# Patient Record
Sex: FEMALE | NOT HISPANIC OR LATINO | Employment: UNEMPLOYED | URBAN - METROPOLITAN AREA
[De-identification: names, ages, dates, MRNs, and addresses within clinical notes are randomized per-mention and may not be internally consistent; named-entity substitution may affect disease eponyms.]

---

## 2017-01-04 ENCOUNTER — TELEPHONE (OUTPATIENT)
Dept: MATERNAL FETAL MEDICINE | Facility: CLINIC | Age: 22
End: 2017-01-04

## 2017-01-04 NOTE — TELEPHONE ENCOUNTER
Received ultrasound reports from [facility].  The patient had an ultrasound on 9/6/16 that confirmed her due by LMP of 2/18/17 (fetal measurements averaged 16 weeks and 4 days).  Therefore, the LMP dating is correct, and LMP-based ZELALEM of 2/18/17 should be used.   Thus, the fetus was AGA at the time of the ultrasound study performed here in the [facility] unit on 12/14/16.

## 2017-01-24 ENCOUNTER — PATIENT MESSAGE (OUTPATIENT)
Dept: OBSTETRICS AND GYNECOLOGY | Facility: CLINIC | Age: 22
End: 2017-01-24

## 2017-01-26 ENCOUNTER — PATIENT MESSAGE (OUTPATIENT)
Dept: OBSTETRICS AND GYNECOLOGY | Facility: CLINIC | Age: 22
End: 2017-01-26

## 2017-02-01 ENCOUNTER — LAB VISIT (OUTPATIENT)
Dept: LAB | Facility: OTHER | Age: 22
End: 2017-02-01
Attending: OBSTETRICS & GYNECOLOGY
Payer: MEDICAID

## 2017-02-01 ENCOUNTER — ROUTINE PRENATAL (OUTPATIENT)
Dept: OBSTETRICS AND GYNECOLOGY | Facility: CLINIC | Age: 22
End: 2017-02-01
Payer: MEDICAID

## 2017-02-01 VITALS
DIASTOLIC BLOOD PRESSURE: 80 MMHG | SYSTOLIC BLOOD PRESSURE: 120 MMHG | BODY MASS INDEX: 29.68 KG/M2 | WEIGHT: 167.56 LBS

## 2017-02-01 DIAGNOSIS — Z34.03 ENCOUNTER FOR SUPERVISION OF NORMAL FIRST PREGNANCY IN THIRD TRIMESTER: Primary | ICD-10-CM

## 2017-02-01 DIAGNOSIS — Z34.03 ENCOUNTER FOR SUPERVISION OF NORMAL FIRST PREGNANCY IN THIRD TRIMESTER: ICD-10-CM

## 2017-02-01 DIAGNOSIS — D58.2 HEMOGLOBIN C TRAIT: ICD-10-CM

## 2017-02-01 LAB
BASOPHILS # BLD AUTO: 0.03 K/UL
BASOPHILS NFR BLD: 0.7 %
DIFFERENTIAL METHOD: ABNORMAL
EOSINOPHIL # BLD AUTO: 0 K/UL
EOSINOPHIL NFR BLD: 0 %
ERYTHROCYTE [DISTWIDTH] IN BLOOD BY AUTOMATED COUNT: 19.7 %
HCT VFR BLD AUTO: 28.3 %
HGB BLD-MCNC: 9.3 G/DL
LYMPHOCYTES # BLD AUTO: 1.5 K/UL
LYMPHOCYTES NFR BLD: 36.1 %
MCH RBC QN AUTO: 22.5 PG
MCHC RBC AUTO-ENTMCNC: 32.9 %
MCV RBC AUTO: 69 FL
MONOCYTES # BLD AUTO: 0.9 K/UL
MONOCYTES NFR BLD: 21.8 %
NEUTROPHILS # BLD AUTO: 1.7 K/UL
NEUTROPHILS NFR BLD: 40.9 %
PLATELET # BLD AUTO: 296 K/UL
PMV BLD AUTO: 9.5 FL
RBC # BLD AUTO: 4.13 M/UL
WBC # BLD AUTO: 4.18 K/UL

## 2017-02-01 PROCEDURE — 36415 COLL VENOUS BLD VENIPUNCTURE: CPT

## 2017-02-01 PROCEDURE — 99213 OFFICE O/P EST LOW 20 MIN: CPT | Mod: TH,S$PBB,, | Performed by: OBSTETRICS & GYNECOLOGY

## 2017-02-01 PROCEDURE — 85025 COMPLETE CBC W/AUTO DIFF WBC: CPT

## 2017-02-01 PROCEDURE — 86592 SYPHILIS TEST NON-TREP QUAL: CPT

## 2017-02-01 PROCEDURE — 99999 PR PBB SHADOW E&M-EST. PATIENT-LVL II: CPT | Mod: PBBFAC,,, | Performed by: OBSTETRICS & GYNECOLOGY

## 2017-02-01 PROCEDURE — 86703 HIV-1/HIV-2 1 RESULT ANTBDY: CPT

## 2017-02-01 NOTE — MR AVS SNAPSHOT
[facility] [address]  [address]  [address]  [facility] [address]  Phone: [phone]  Fax: [phone]                  Nati Billy   2017 3:00 PM   Routine Prenatal    Description:  Female : 1995   Provider:  Suze Odonnell MD   Department:  [facility] [address]           Reason for Visit     Routine Prenatal Visit                To Do List           Future Appointments        Provider Department Dept Phone    2017 3:30 PM Suze Odonnell MD [facility] [address] [phone]    2017 3:30 PM Suze Odonnell MD [facility] [address] [phone]      Goals (5 Years of Data)     None      [facility]     [facility] Line - 24/7 Assistance  Registered nurses in the [facility] Center provide clinical advisement, health education, appointment booking, and other advisory services.  Call for this free service at [phone].             Medications           Message regarding Medications     Verify the changes and/or additions to your medication regime listed below are the same as discussed with your clinician today.  If any of these changes or additions are incorrect, please notify your healthcare provider.             Verify that the below list of medications is an accurate representation of the medications you are currently taking.  If none reported, the list may be blank. If incorrect, please contact your healthcare provider. Carry this list with you in case of emergency.           Current Medications     PRENATAL VIT #91/FE FUM/FA/DHA (PRENATAL + DHA ORAL) Take by mouth.           Clinical Reference Information           Prenatal Vitals     Enc. Date GA Prenatal Vitals Prenatal Pulse Pain Level Urine Albumin/Glucose Edema Presentation Dilation/Effacement/Station    17 37w4d 120/80 / 76 kg (167 lb 8.8 oz)  /  / Present  0 Negative / Negative None / None      16 33w3d 110/60 / 75 kg (165 lb  5.5 oz) 29 cm / +++ / Present  0 Negative / Negative None / None         Number of fetuses: 1       Vital Signs - Last Recorded  Most recent update: 2/1/2017  3:27 PM by Jennifer Cardoza    BP Wt LMP BMI       120/80 76 kg (167 lb 8.8 oz) 05/14/2016 29.68 kg/m2       Allergies as of 2/1/2017     No Known Allergies      Immunizations Administered on Date of Encounter - 2/1/2017     None

## 2017-02-01 NOTE — PROGRESS NOTES
Positive fetal movement.  No contractions, vaginal bleeding, or loss of fluid.    Discussed hemoglobin C trait with patient.  Dad's blood type is uknown.  Discussed the chances that the baby would have sickle cell disease.   TDAP never done.  Will do today.   CBC, RPR, HIv, and GBS today.

## 2017-02-02 ENCOUNTER — PATIENT MESSAGE (OUTPATIENT)
Dept: OBSTETRICS AND GYNECOLOGY | Facility: CLINIC | Age: 22
End: 2017-02-02

## 2017-02-02 DIAGNOSIS — D50.8 IRON DEFICIENCY ANEMIA SECONDARY TO INADEQUATE DIETARY IRON INTAKE: Primary | ICD-10-CM

## 2017-02-02 LAB
HIV 1+2 AB+HIV1 P24 AG SERPL QL IA: NEGATIVE
RPR SER QL: NORMAL

## 2017-02-02 RX ORDER — FERROUS SULFATE 325(65) MG
325 TABLET ORAL DAILY
Qty: 60 TABLET | Refills: 3 | Status: ON HOLD | OUTPATIENT
Start: 2017-02-02 | End: 2017-02-17 | Stop reason: HOSPADM

## 2017-02-03 ENCOUNTER — HOSPITAL ENCOUNTER (EMERGENCY)
Facility: OTHER | Age: 22
Discharge: HOME | End: 2017-02-03
Payer: MEDICAID

## 2017-02-03 VITALS
SYSTOLIC BLOOD PRESSURE: 126 MMHG | RESPIRATION RATE: 18 BRPM | OXYGEN SATURATION: 100 % | TEMPERATURE: 98 F | HEART RATE: 91 BPM | DIASTOLIC BLOOD PRESSURE: 75 MMHG

## 2017-02-03 LAB — BACTERIA SPEC AEROBE CULT: NORMAL

## 2017-02-03 PROCEDURE — 76815 OB US LIMITED FETUS(S): CPT

## 2017-02-03 PROCEDURE — 59025 FETAL NON-STRESS TEST: CPT | Mod: 59

## 2017-02-03 PROCEDURE — 99284 EMERGENCY DEPT VISIT MOD MDM: CPT | Mod: 25

## 2017-02-03 NOTE — DISCHARGE INSTRUCTIONS
False Labor  If your pregnancy is at 37 weeks or longer and you are having contractions that are not true labor, you are having false labor. This means that it is not time yet to give birth to your baby.  True labor contractions can start unevenly but soon take on a regular pattern. As time goes on, the contractions will get stronger. Also, the intervals between the contractions will get shorter. Even at the onset, these contractions last at least 30 seconds and may increase to a minute. True labor contractions often start in the back and then move to the front.  False labor contractions can be strong, frequent, and painful, but there is no regular pattern. The intensity can vary from strong to mild to strong again. False labor contractions are most often felt in the front. While true labor contractions dont stop no matter what you are doing, false labor contractions may stop on their own or when you rest or move around.  False labor contractions might make you feel anxious or lose sleep. However, they dont mean that you are sick or that anything is wrong with your baby. You dont need to take any medicine for false labor.  Sometimes, it may be too hard to tell false labor from true labor. In such cases, you may need to have a vaginal exam. This allows your healthcare provider to check for changes in the cervix that only occur with true labor.  Home care  · It may help to drink plenty of water and take warm baths. Do what you can ahead of time to prepare for giving birth so youll have less to worry about later.  · Keep a record of your contractions. Write down what time each one starts and how long it lasts. A stopwatch is helpful. Look for the pattern of regularly spaced out contractions with a gradual increase in the time each one lasts.  · Dont be embarrassed about going to the hospital with a false alarm. Think of it as good practice for the real thing.    Follow-up care  Follow up with your healthcare  provider, or as advised. If you are very worried, confused, cant eat or sleep, or have questions about your health or pregnancy, schedule an appointment with your provider.  When to seek medical advice  Call your healthcare provider right away if any of these occur:  · You are fewer than 37 weeks along in your pregnancy and youre having contractions.  · You have contractions that are regular, getting longer, stronger, and closer together.  · Your water breaks.  · You have vaginal bleeding.  · You feel a decrease in your babys movement or any other unusual changes.   · Youre not sure if you are having false or true labor.  Date Last Reviewed: 8/20/2015  © 3664-9548 [employer]. [address] [place] [address], [place] [address]. All rights reserved. This information is not intended as a substitute for professional medical care. Always follow your healthcare professional's instructions.

## 2017-02-03 NOTE — ED AVS SNAPSHOT
[facility]  [address]  [facility] [address]               Nati Billy   2/3/2017 12:47 PM   ED    Description:  Female : 1995   Department:  [facility]           Your Care was Coordinated By:     Provider Role From To    Blanka De Leon MD Resident 17 1250 --      Reason for Visit     Contractions           Diagnoses this Visit        Comments    Prolonged latent phase of labor    -  Primary       ED Disposition     ED Disposition Condition Comment    Discharge             To Do List           Follow-up Information     Follow up with Suze Odonnell MD.    Specialty:  Obstetrics and Gynecology    Why:  prenatal appointment as scheduled    Contact information:    [address]  [facility] [address]  [phone]        [facility]     [facility] Care Line -  Assistance  Registered nurses in the [facility] provide clinical advisement, health education, appointment booking, and other advisory services.  Call for this free service at [phone].             Medications           Message regarding Medications     Verify the changes and/or additions to your medication regime listed below are the same as discussed with your clinician today.  If any of these changes or additions are incorrect, please notify your healthcare provider.             Verify that the below list of medications is an accurate representation of the medications you are currently taking.  If none reported, the list may be blank. If incorrect, please contact your healthcare provider. Carry this list with you in case of emergency.           Current Medications     ferrous sulfate 325 mg (65 mg iron) Tab tablet Take 1 tablet (325 mg total) by mouth once daily.    PRENATAL VIT #91/FE FUM/FA/DHA (PRENATAL + DHA ORAL) Take by mouth.           Clinical Reference Information           Your Vitals Were     BP Pulse Temp  Resp Last Period SpO2    126/75 91 97.7 °F (36.5 °C) (Temporal) 18 05/14/2016 100%      Allergies as of 2/3/2017     No Known Allergies      Immunizations Administered on Date of Encounter - 2/3/2017     None      ED Micro, Lab, POCT     None      ED Imaging Orders     None        Discharge Instructions         False Labor  If your pregnancy is at 37 weeks or longer and you are having contractions that are not true labor, you are having false labor. This means that it is not time yet to give birth to your baby.  True labor contractions can start unevenly but soon take on a regular pattern. As time goes on, the contractions will get stronger. Also, the intervals between the contractions will get shorter. Even at the onset, these contractions last at least 30 seconds and may increase to a minute. True labor contractions often start in the back and then move to the front.  False labor contractions can be strong, frequent, and painful, but there is no regular pattern. The intensity can vary from strong to mild to strong again. False labor contractions are most often felt in the front. While true labor contractions dont stop no matter what you are doing, false labor contractions may stop on their own or when you rest or move around.  False labor contractions might make you feel anxious or lose sleep. However, they dont mean that you are sick or that anything is wrong with your baby. You dont need to take any medicine for false labor.  Sometimes, it may be too hard to tell false labor from true labor. In such cases, you may need to have a vaginal exam. This allows your healthcare provider to check for changes in the cervix that only occur with true labor.  Home care  · It may help to drink plenty of water and take warm baths. Do what you can ahead of time to prepare for giving birth so youll have less to worry about later.  · Keep a record of your contractions. Write down what time each one starts and how long it  lasts. A stopwatch is helpful. Look for the pattern of regularly spaced out contractions with a gradual increase in the time each one lasts.  · Dont be embarrassed about going to the hospital with a false alarm. Think of it as good practice for the real thing.    Follow-up care  Follow up with your healthcare provider, or as advised. If you are very worried, confused, cant eat or sleep, or have questions about your health or pregnancy, schedule an appointment with your provider.  When to seek medical advice  Call your healthcare provider right away if any of these occur:  · You are fewer than 37 weeks along in your pregnancy and youre having contractions.  · You have contractions that are regular, getting longer, stronger, and closer together.  · Your water breaks.  · You have vaginal bleeding.  · You feel a decrease in your babys movement or any other unusual changes.   · Youre not sure if you are having false or true labor.  Date Last Reviewed: 8/20/2015  © 3253-8194 [employer]. [address] [place] [address]. All rights reserved. This information is not intended as a substitute for professional medical care. Always follow your healthcare professional's instructions.          Your Scheduled Appointments     Feb 09, 2017  3:00 PM CST   Injection with GYN, INJECTION   [facility] [address] ([facility])    [address] [place] [id number]   [phone]            Feb 09, 2017  3:30 PM CST   Routine Prenatal Visit with Suze Odonnell MD   [facility] [address] ([facility])    [address] [place] [address]   [phone]            Feb 16, 2017  3:30 PM CST   Routine Prenatal Visit with MD [facility] [address] ([facility])    [address] [address]   [phone]               [facility] complies with applicable Federal civil rights laws and does not discriminate on the  basis of race, color, national origin, age, disability, or sex.        Language Assistance Services     ATTENTION: Language assistance services are available, free of charge. Please call [phone].      ATENCIÓN: Si habla malgorzata, tiene a corrales disposición servicios gratuitos de asistencia lingüística. Llame al [phone].     CHÚ Ý: N?u b?n nói Ti?ng Vi?t, có các d?ch v? h? tr? ngôn ng? mi?n phí dành cho b?n. G?i s? [phone].

## 2017-02-15 ENCOUNTER — TELEPHONE (OUTPATIENT)
Dept: OBSTETRICS AND GYNECOLOGY | Facility: CLINIC | Age: 22
End: 2017-02-15

## 2017-02-15 ENCOUNTER — PATIENT MESSAGE (OUTPATIENT)
Dept: OBSTETRICS AND GYNECOLOGY | Facility: CLINIC | Age: 22
End: 2017-02-15

## 2017-02-16 ENCOUNTER — ANESTHESIA EVENT (OUTPATIENT)
Dept: OBSTETRICS AND GYNECOLOGY | Facility: OTHER | Age: 22
End: 2017-02-16
Payer: MEDICAID

## 2017-02-16 ENCOUNTER — HOSPITAL ENCOUNTER (INPATIENT)
Facility: OTHER | Age: 22
LOS: 2 days | Discharge: HOME | End: 2017-02-18
Attending: OBSTETRICS & GYNECOLOGY | Admitting: OBSTETRICS & GYNECOLOGY
Payer: MEDICAID

## 2017-02-16 ENCOUNTER — ANESTHESIA (OUTPATIENT)
Dept: OBSTETRICS AND GYNECOLOGY | Facility: OTHER | Age: 22
End: 2017-02-16
Payer: MEDICAID

## 2017-02-16 DIAGNOSIS — Z37.9 NORMAL LABOR: ICD-10-CM

## 2017-02-16 LAB
ABO + RH BLD: NORMAL
ALLENS TEST: ABNORMAL
AMPHET+METHAMPHET UR QL: NEGATIVE
ANISOCYTOSIS BLD QL SMEAR: SLIGHT
BARBITURATES UR QL SCN>200 NG/ML: NEGATIVE
BASOPHILS NFR BLD: 0 %
BENZODIAZ UR QL SCN>200 NG/ML: NEGATIVE
BLD GP AB SCN CELLS X3 SERPL QL: NORMAL
BZE UR QL SCN: NEGATIVE
CANNABINOIDS UR QL SCN: NORMAL
CREAT UR-MCNC: 78 MG/DL
DIFFERENTIAL METHOD: ABNORMAL
EOSINOPHIL NFR BLD: 2 %
ERYTHROCYTE [DISTWIDTH] IN BLOOD BY AUTOMATED COUNT: 21.8 %
ETHANOL UR-MCNC: <10 MG/DL
GIANT PLATELETS BLD QL SMEAR: PRESENT
HCO3 UR-SCNC: 22.7 MMOL/L (ref 24–28)
HCT VFR BLD AUTO: 28.7 %
HGB BLD-MCNC: 9.5 G/DL
LYMPHOCYTES NFR BLD: 19 %
MCH RBC QN AUTO: 22.7 PG
MCHC RBC AUTO-ENTMCNC: 33.1 %
MCV RBC AUTO: 69 FL
METHADONE UR QL SCN>300 NG/ML: NEGATIVE
MONOCYTES NFR BLD: 10 %
NEUTROPHILS NFR BLD: 69 %
OPIATES UR QL SCN: NEGATIVE
OVALOCYTES BLD QL SMEAR: ABNORMAL
PCO2 BLDA: 60.4 MMHG (ref 35–45)
PCP UR QL SCN>25 NG/ML: NEGATIVE
PH SMN: 7.18 [PH] (ref 7.35–7.45)
PLATELET # BLD AUTO: 374 K/UL
PLATELET BLD QL SMEAR: ABNORMAL
PMV BLD AUTO: 8.9 FL
PO2 BLDA: 9 MMHG (ref 80–100)
POC BE: -6 MMOL/L
POC SATURATED O2: 6 % (ref 95–100)
POIKILOCYTOSIS BLD QL SMEAR: SLIGHT
POLYCHROMASIA BLD QL SMEAR: ABNORMAL
RBC # BLD AUTO: 4.19 M/UL
SAMPLE: ABNORMAL
SITE: ABNORMAL
SMUDGE CELLS BLD QL SMEAR: PRESENT
TOXICOLOGY INFORMATION: NORMAL
WBC # BLD AUTO: 12.86 K/UL
WBC NRBC COR # BLD: 12.86 K/UL

## 2017-02-16 PROCEDURE — 25000003 PHARM REV CODE 250: Performed by: ANESTHESIOLOGY

## 2017-02-16 PROCEDURE — 25000003 PHARM REV CODE 250: Performed by: STUDENT IN AN ORGANIZED HEALTH CARE EDUCATION/TRAINING PROGRAM

## 2017-02-16 PROCEDURE — 86901 BLOOD TYPING SEROLOGIC RH(D): CPT

## 2017-02-16 PROCEDURE — 82803 BLOOD GASES ANY COMBINATION: CPT

## 2017-02-16 PROCEDURE — 27800517 HC TRAY,EPIDURAL-CONTINUOUS: Performed by: ANESTHESIOLOGY

## 2017-02-16 PROCEDURE — 72200004 HC VAGINAL DELIVERY LEVEL I

## 2017-02-16 PROCEDURE — 63600175 PHARM REV CODE 636 W HCPCS

## 2017-02-16 PROCEDURE — 27000181 HC CABLE, IUPC

## 2017-02-16 PROCEDURE — 27200710 HC EPIDURAL INFUSION PUMP SET: Performed by: ANESTHESIOLOGY

## 2017-02-16 PROCEDURE — 59409 OBSTETRICAL CARE: CPT | Mod: GB,,, | Performed by: OBSTETRICS & GYNECOLOGY

## 2017-02-16 PROCEDURE — 11000001 HC ACUTE MED/SURG PRIVATE ROOM

## 2017-02-16 PROCEDURE — 59409 OBSTETRICAL CARE: CPT | Mod: AA,,, | Performed by: ANESTHESIOLOGY

## 2017-02-16 PROCEDURE — 99900035 HC TECH TIME PER 15 MIN (STAT)

## 2017-02-16 PROCEDURE — 99285 EMERGENCY DEPT VISIT HI MDM: CPT | Mod: 25

## 2017-02-16 PROCEDURE — 99283 EMERGENCY DEPT VISIT LOW MDM: CPT | Mod: 25,,, | Performed by: OBSTETRICS & GYNECOLOGY

## 2017-02-16 PROCEDURE — 10907ZC DRAINAGE OF AMNIOTIC FLUID, THERAPEUTIC FROM PRODUCTS OF CONCEPTION, VIA NATURAL OR ARTIFICIAL OPENING: ICD-10-PCS | Performed by: OBSTETRICS & GYNECOLOGY

## 2017-02-16 PROCEDURE — 85025 COMPLETE CBC W/AUTO DIFF WBC: CPT

## 2017-02-16 PROCEDURE — 51702 INSERT TEMP BLADDER CATH: CPT

## 2017-02-16 PROCEDURE — 62326 NJX INTERLAMINAR LMBR/SAC: CPT | Performed by: ANESTHESIOLOGY

## 2017-02-16 PROCEDURE — 76815 OB US LIMITED FETUS(S): CPT

## 2017-02-16 PROCEDURE — 63600175 PHARM REV CODE 636 W HCPCS: Performed by: ANESTHESIOLOGY

## 2017-02-16 PROCEDURE — 59025 FETAL NON-STRESS TEST: CPT | Mod: 59

## 2017-02-16 PROCEDURE — 86900 BLOOD TYPING SEROLOGIC ABO: CPT

## 2017-02-16 PROCEDURE — 72100002 HC LABOR CARE, 1ST 8 HOURS

## 2017-02-16 PROCEDURE — 0HQ9XZZ REPAIR PERINEUM SKIN, EXTERNAL APPROACH: ICD-10-PCS | Performed by: OBSTETRICS & GYNECOLOGY

## 2017-02-16 PROCEDURE — 99499 UNLISTED E&M SERVICE: CPT | Mod: ,,, | Performed by: OBSTETRICS & GYNECOLOGY

## 2017-02-16 PROCEDURE — 80307 DRUG TEST PRSMV CHEM ANLYZR: CPT

## 2017-02-16 PROCEDURE — 59025 FETAL NON-STRESS TEST: CPT | Mod: 26,,, | Performed by: OBSTETRICS & GYNECOLOGY

## 2017-02-16 RX ORDER — OXYTOCIN/RINGER'S LACTATE 20/1000 ML
41.65 PLASTIC BAG, INJECTION (ML) INTRAVENOUS CONTINUOUS
Status: DISCONTINUED | OUTPATIENT
Start: 2017-02-16 | End: 2017-02-16

## 2017-02-16 RX ORDER — FAMOTIDINE 10 MG/ML
20 INJECTION INTRAVENOUS ONCE
Status: DISCONTINUED | OUTPATIENT
Start: 2017-02-16 | End: 2017-02-17

## 2017-02-16 RX ORDER — FENTANYL/BUPIVACAINE/NS/PF 2MCG/ML-.1
PLASTIC BAG, INJECTION (ML) INJECTION CONTINUOUS
Status: DISCONTINUED | OUTPATIENT
Start: 2017-02-16 | End: 2017-02-17

## 2017-02-16 RX ORDER — OXYCODONE AND ACETAMINOPHEN 5; 325 MG/1; MG/1
1 TABLET ORAL EVERY 4 HOURS PRN
Status: DISCONTINUED | OUTPATIENT
Start: 2017-02-16 | End: 2017-02-18 | Stop reason: HOSPADM

## 2017-02-16 RX ORDER — AMMONIA 15 % (W/V)
0.3 AMPUL (EA) INHALATION CONTINUOUS PRN
Status: DISCONTINUED | OUTPATIENT
Start: 2017-02-16 | End: 2017-02-18 | Stop reason: HOSPADM

## 2017-02-16 RX ORDER — DOCUSATE SODIUM 100 MG/1
100 CAPSULE, LIQUID FILLED ORAL 2 TIMES DAILY PRN
Status: DISCONTINUED | OUTPATIENT
Start: 2017-02-16 | End: 2017-02-18 | Stop reason: HOSPADM

## 2017-02-16 RX ORDER — SIMETHICONE 80 MG
1 TABLET,CHEWABLE ORAL EVERY 6 HOURS PRN
Status: DISCONTINUED | OUTPATIENT
Start: 2017-02-16 | End: 2017-02-18 | Stop reason: HOSPADM

## 2017-02-16 RX ORDER — METHYLERGONOVINE MALEATE 0.2 MG/ML
200 INJECTION INTRAVENOUS
Status: DISCONTINUED | OUTPATIENT
Start: 2017-02-16 | End: 2017-02-16

## 2017-02-16 RX ORDER — OXYCODONE AND ACETAMINOPHEN 10; 325 MG/1; MG/1
1 TABLET ORAL EVERY 4 HOURS PRN
Status: DISCONTINUED | OUTPATIENT
Start: 2017-02-16 | End: 2017-02-18 | Stop reason: HOSPADM

## 2017-02-16 RX ORDER — OXYTOCIN/RINGER'S LACTATE 20/1000 ML
20 PLASTIC BAG, INJECTION (ML) INTRAVENOUS ONCE
Status: DISCONTINUED | OUTPATIENT
Start: 2017-02-16 | End: 2017-02-16

## 2017-02-16 RX ORDER — LIDOCAINE HYDROCHLORIDE AND EPINEPHRINE 15; 5 MG/ML; UG/ML
INJECTION, SOLUTION EPIDURAL
Status: DISCONTINUED | OUTPATIENT
Start: 2017-02-16 | End: 2017-02-16

## 2017-02-16 RX ORDER — DIPHENHYDRAMINE HCL 25 MG
25 CAPSULE ORAL EVERY 4 HOURS PRN
Status: DISCONTINUED | OUTPATIENT
Start: 2017-02-16 | End: 2017-02-18 | Stop reason: HOSPADM

## 2017-02-16 RX ORDER — BUPIVACAINE HYDROCHLORIDE 2.5 MG/ML
INJECTION, SOLUTION EPIDURAL; INFILTRATION; INTRACAUDAL
Status: DISPENSED
Start: 2017-02-16 | End: 2017-02-16

## 2017-02-16 RX ORDER — HYDROCORTISONE 25 MG/G
CREAM TOPICAL 3 TIMES DAILY PRN
Status: DISCONTINUED | OUTPATIENT
Start: 2017-02-16 | End: 2017-02-18 | Stop reason: HOSPADM

## 2017-02-16 RX ORDER — METOCLOPRAMIDE HYDROCHLORIDE 5 MG/ML
10 INJECTION INTRAMUSCULAR; INTRAVENOUS ONCE
Status: DISCONTINUED | OUTPATIENT
Start: 2017-02-16 | End: 2017-02-17

## 2017-02-16 RX ORDER — OXYTOCIN/RINGER'S LACTATE 20/1000 ML
2 PLASTIC BAG, INJECTION (ML) INTRAVENOUS CONTINUOUS
Status: DISCONTINUED | OUTPATIENT
Start: 2017-02-16 | End: 2017-02-17

## 2017-02-16 RX ORDER — ONDANSETRON 4 MG/1
8 TABLET, ORALLY DISINTEGRATING ORAL EVERY 8 HOURS PRN
Status: DISCONTINUED | OUTPATIENT
Start: 2017-02-16 | End: 2017-02-18 | Stop reason: HOSPADM

## 2017-02-16 RX ORDER — FENTANYL CITRATE 50 UG/ML
INJECTION, SOLUTION INTRAMUSCULAR; INTRAVENOUS
Status: COMPLETED
Start: 2017-02-16 | End: 2017-02-16

## 2017-02-16 RX ORDER — FENTANYL/BUPIVACAINE/NS/PF 2MCG/ML-.1
PLASTIC BAG, INJECTION (ML) INJECTION CONTINUOUS PRN
Status: DISCONTINUED | OUTPATIENT
Start: 2017-02-16 | End: 2017-02-16

## 2017-02-16 RX ORDER — AMOXICILLIN 250 MG
1 CAPSULE ORAL NIGHTLY
Status: DISCONTINUED | OUTPATIENT
Start: 2017-02-16 | End: 2017-02-18 | Stop reason: HOSPADM

## 2017-02-16 RX ORDER — ONDANSETRON 8 MG/1
8 TABLET, ORALLY DISINTEGRATING ORAL EVERY 8 HOURS PRN
Status: DISCONTINUED | OUTPATIENT
Start: 2017-02-16 | End: 2017-02-16

## 2017-02-16 RX ORDER — FENTANYL/BUPIVACAINE/NS/PF 2MCG/ML-.1
PLASTIC BAG, INJECTION (ML) INJECTION
Status: DISPENSED
Start: 2017-02-16 | End: 2017-02-16

## 2017-02-16 RX ORDER — PHENYLEPHRINE HYDROCHLORIDE 10 MG/ML
INJECTION INTRAVENOUS
Status: DISCONTINUED | OUTPATIENT
Start: 2017-02-16 | End: 2017-02-16

## 2017-02-16 RX ORDER — SODIUM CITRATE AND CITRIC ACID MONOHYDRATE 334; 500 MG/5ML; MG/5ML
30 SOLUTION ORAL ONCE
Status: DISCONTINUED | OUTPATIENT
Start: 2017-02-16 | End: 2017-02-17

## 2017-02-16 RX ORDER — CARBOPROST TROMETHAMINE 250 UG/ML
250 INJECTION, SOLUTION INTRAMUSCULAR
Status: DISCONTINUED | OUTPATIENT
Start: 2017-02-16 | End: 2017-02-16

## 2017-02-16 RX ORDER — MISOPROSTOL 200 UG/1
600 TABLET ORAL
Status: DISCONTINUED | OUTPATIENT
Start: 2017-02-16 | End: 2017-02-16

## 2017-02-16 RX ORDER — SODIUM CHLORIDE, SODIUM LACTATE, POTASSIUM CHLORIDE, CALCIUM CHLORIDE 600; 310; 30; 20 MG/100ML; MG/100ML; MG/100ML; MG/100ML
INJECTION, SOLUTION INTRAVENOUS CONTINUOUS
Status: DISCONTINUED | OUTPATIENT
Start: 2017-02-16 | End: 2017-02-16

## 2017-02-16 RX ORDER — NAPROXEN 500 MG/1
500 TABLET ORAL EVERY 8 HOURS PRN
Status: DISCONTINUED | OUTPATIENT
Start: 2017-02-16 | End: 2017-02-18 | Stop reason: HOSPADM

## 2017-02-16 RX ADMIN — Medication 5 ML: at 08:02

## 2017-02-16 RX ADMIN — SODIUM CHLORIDE, SODIUM LACTATE, POTASSIUM CHLORIDE, AND CALCIUM CHLORIDE: .6; .31; .03; .02 INJECTION, SOLUTION INTRAVENOUS at 07:02

## 2017-02-16 RX ADMIN — Medication 10 ML/HR: at 08:02

## 2017-02-16 RX ADMIN — PHENYLEPHRINE HYDROCHLORIDE 100 MCG: 10 INJECTION INTRAVENOUS at 09:02

## 2017-02-16 RX ADMIN — NAPROXEN 500 MG: 500 TABLET ORAL at 09:02

## 2017-02-16 RX ADMIN — STANDARDIZED SENNA CONCENTRATE AND DOCUSATE SODIUM 1 TABLET: 8.6; 5 TABLET, FILM COATED ORAL at 08:02

## 2017-02-16 RX ADMIN — OXYCODONE AND ACETAMINOPHEN 1 TABLET: 5; 325 TABLET ORAL at 08:02

## 2017-02-16 RX ADMIN — Medication 333 MILLI-UNITS/MIN: at 02:02

## 2017-02-16 RX ADMIN — LIDOCAINE HYDROCHLORIDE AND EPINEPHRINE 3 ML: 15; 5 INJECTION, SOLUTION EPIDURAL at 08:02

## 2017-02-16 RX ADMIN — SODIUM CHLORIDE, SODIUM LACTATE, POTASSIUM CHLORIDE, AND CALCIUM CHLORIDE 1000 ML: .6; .31; .03; .02 INJECTION, SOLUTION INTRAVENOUS at 09:02

## 2017-02-16 RX ADMIN — FENTANYL CITRATE 100 MCG: 50 INJECTION, SOLUTION INTRAMUSCULAR; INTRAVENOUS at 08:02

## 2017-02-16 RX ADMIN — SODIUM CHLORIDE, SODIUM LACTATE, POTASSIUM CHLORIDE, AND CALCIUM CHLORIDE 1000 ML: .6; .31; .03; .02 INJECTION, SOLUTION INTRAVENOUS at 07:02

## 2017-02-16 NOTE — PROGRESS NOTES
LABOR PROGRESS NOTE    S:  MD to bedside for fetal decel to 55 bpm; recovery within 2 minutes with maternal repositioning; O2 mask on. Complaints: No.  Epidural in place and working.     O: Temp:  [95.9 °F (35.5 °C)-97.5 °F (36.4 °C)] 97.3 °F (36.3 °C)  Pulse:  [] 86  Resp:  [19] 19  SpO2:  [66 %-100 %] 95 %  BP: (105-174)/(50-83) 120/59    FHT: 145 BPM/moderate beat to beat variability/+accels/+ decels to 55bpm with rapid recovery; Cat 2 (reassuring)  CTX: q2-4 min, no pitocin at this time  SVE: /-2    ASSESSMENT:   21 y.o.  at 39w5d, in labor at term    FHT reassuring    Active Hospital Problems    Diagnosis  POA    *Normal labor [O80, Z37.9]  Not Applicable      Resolved Hospital Problems    Diagnosis Date Resolved POA   No resolved problems to display.     PLAN:    Labor management  Continue Close Maternal/Fetal Monitoring.   Pitocin Augmentation per protocol being held  AROM, clear  IUPC in place and working  Recheck 2 hours or PRN  Staff notified    lBanka De Leon MD, PhD  OBGYN, PGY-1

## 2017-02-16 NOTE — H&P
History and Physical                                            Obstetrics          Subjective:      Nati Billy is a 21 y.o. F at 39w5d presents complaining of contractions. Patient reports regular frequent contractions since 0300 this morning.   This IUP is complicated by Hgb C trait, family hx of SC disease and microcytic anemia. Patient denies vaginal bleeding, denies LOF. Fetal Movement: normal.    PMHx: History reviewed. No pertinent past medical history.    PSHx:   Past Surgical History   Procedure Laterality Date    Wrist surgery         All: Review of patient's allergies indicates:  No Known Allergies    Meds:   Prescriptions Prior to Admission   Medication Sig Dispense Refill Last Dose    ferrous sulfate 325 mg (65 mg iron) Tab tablet Take 1 tablet (325 mg total) by mouth once daily. 60 tablet 3     PRENATAL VIT #91/FE FUM/FA/DHA (PRENATAL + DHA ORAL) Take by mouth.   Taking       SH:   Social History     Social History    Marital status: Single     Spouse name: N/A    Number of children: N/A    Years of education: N/A     Occupational History    Not on file.     Social History Main Topics    Smoking status: Never Smoker    Smokeless tobacco: Not on file    Alcohol use No    Drug use: No    Sexual activity: Yes     Birth control/ protection: None     Other Topics Concern    Not on file     Social History Narrative       FH: History reviewed. No pertinent family history.    OBHx:   Obstetric History       T0      TAB0   SAB0   E0   M0   L0       # Outcome Date GA Lbr Duane/2nd Weight Sex Delivery Anes PTL Lv   1 Current                     Review of Systems:  Constitutional: Negative for fever.   HENT: Negative for sore throat.   Respiratory: Negative for shortness of breath.   Cardiovascular: Negative for chest pain.   Gastrointestinal: Negative for nausea.   Genitourinary: Negative for dysuria.   Musculoskeletal: Negative for back pain.   Skin: Negative for  rash.   Neurological: Negative for weakness.   Hematological: Does not bruise/bleed easily.     Objective:         Visit Vitals    /62    Pulse 99    Temp 97.3 °F (36.3 °C)    Resp 19    LMP 2016    SpO2 98%    Breastfeeding No       Temp:  [95.9 °F (35.5 °C)-97.5 °F (36.4 °C)] 97.3 °F (36.3 °C)  Pulse:  [] 99  Resp:  [19] 19  SpO2:  [95 %-100 %] 98 %  BP: (131-134)/(62-74) 134/62     Constitutional: She appears well-developed and well-nourished. She is not diaphoretic. No distress.   HENT:   Head: Normocephalic and atraumatic.   Eyes: Conjunctivae and EOM are normal.   Neck: Normal range of motion.   Cardiovascular: Normal rate.   Pulmonary/Chest: No respiratory distress.   Musculoskeletal: Normal range of motion.   Neurological: She is alert and oriented to person, place, and time.   Skin: Skin is warm and dry.   Psychiatric: She has a normal mood and affect.     SVE 1.5/90/-3 --> recheck 2 hrs 3/-3, bulging bag     Comments: FHT: reassuring, 145bpm, mod BTB variability, + accels, -decels  TOCO: q3-5min    Ultrasound:  cephalic      Lab Review  Blood Type O POS  GBBS: negative  Rubella: Immune  RPR: -  HIV: negative  HepB: negative           Assessment:       39w5d weeks gestation admitted for Normal labor    Active Hospital Problems    Diagnosis  POA    *Normal labor [O80, Z37.9]  Not Applicable      Resolved Hospital Problems    Diagnosis Date Resolved POA   No resolved problems to display.          Plan:          Normal labor  - Admit to Labor and Delivery unit  - Consents for delivery including vaginal or  section and blood transfusion signed and to chart  - Risks, benefits, alternatives and possible complications have been discussed in detail with the patient.   - Epidural per Anesthesia  - Draw CBC, T&S  - Notify Staff  - Recheck in 2 hrs or PRN          Post-Partum Hemorrhage risk - low    Sheron Cleary MD

## 2017-02-16 NOTE — PROGRESS NOTES
LABOR PROGRESS NOTE    S:  MD to bedside for cervical check; O2 mask on. Complaints: No.  Epidural in place and working.     O: Temp:  [95.9 °F (35.5 °C)-99 °F (37.2 °C)] 99 °F (37.2 °C)  Pulse:  [] 88  Resp:  [18-19] 18  SpO2:  [66 %-100 %] 100 %  BP: ()/(50-85) 137/63    FHT: 145 BPM/moderate beat to beat variability/+accels/+ occasional small variable decels; Cat 2 (reassuring)  CTX: q2-4 min, no pitocin at this time  SVE: anterior lip/100/+1-+2    ASSESSMENT:   21 y.o.  at 39w5d, in labor at term    FHT reassuring    Active Hospital Problems    Diagnosis  POA    *Normal labor [O80, Z37.9]  Not Applicable      Resolved Hospital Problems    Diagnosis Date Resolved POA   No resolved problems to display.     PLAN:    Labor management  Continue Close Maternal/Fetal Monitoring.   Pitocin Augmentation per protocol being held  AROM, clear  IUPC in place   Staff notified  Set up to push  Anticipate     Blanka De Leon MD, PhD  OBGYN, PGY-1

## 2017-02-16 NOTE — L&D DELIVERY NOTE
cephalic after approximately 12 minutes of maternal pushing.  Under epidural anesthesia.  Infant delivered OA over intact perineum.  Delivered through tight nuchal x1 noted at introitus.  Cord clamped and cut.  Female infant also tolerated the delivery well and was placed on mothers abdomen for skin to skin and bulb suctioning performed.  Umbilical arterial gas and venous blood obtained.  Placenta delivered spontaneously and IV pitocin given.  Uterine tone noted. No cervical lacerations.  Periurethral abrasion ws hemostatic and required no repair.   No posterior vaginal laceration required repair.   One left vaginal sidewall first degree laceration was repaired with 3-0 vicryl until hemostatic.  One superficial right vaginal sidewall abrasion was repaired with 3-0 vicryl in a figure-of-eight stitch until hemostatic.  Patient tolerated delivery well.   cc  Staff present for entire procedure.  S/L/N counts correct x2.        Delivery Information for  Manfred Billy    Birth information:  YOB: 2016   Time of birth: 2:35 PM   Sex: female   Head Delivery Date/Time: 2017  2:34 PM   Delivery type: Vaginal, Spontaneous Delivery   Gestational Age: 39w5d    Delivery Providers    Delivering clinician:  HONEY GORE   Other personnel:   Provider Role   GERALD WATT Registered Nurse   BRYSON LUIS Registered Nurse   VALENTE MACHADO Resident   CHERYL MOHR Registered Nurse   ZAID PERDUE Registered Nurse   MYRIAM MARIO [facility]                  [place] Measurements    Weight:  3260 g            Assessment    Living status:  Yes   Apgars    1 Minute:   5 Minute:   10 Minute 15 Minute 20 Minute   Skin Color: 0  1       Heart Rate: 2  2       Reflex Irritability: 2  2       Muscle Tone: 2  2       Respiratory Effort: 2  2       Total: 8  9                  Apgars Assigned By:  VICKY MOHR RN          Assisted Delivery Details:    Forceps attempted?:   No   Vacuum extractor attempted?:  No             Shoulder Dystocia    Shoulder dystocia present?:  No                                             Presentation and Position    Presentation: Vertex   Position:     Occiput    Anterior               Interventions/Resuscitation    Method:  Bulb Suctioning, Tactile Stimulation        Cord    Vessels:  3 vessels   Complications:  Nuchal   Nuchal Intervention:  reduced   Nuchal Cord Description:  loose nuchal cord   Number of Loops:  1   Delayed Cord Clamping?:  No   Cord Clamped Date/Time:  2017  2:36 PM   Cord Blood Disposition:  Sent with Baby   Gases Sent?:  Yes   Stem Cell Collection (by MD):  No        Placenta    Date and time:  2017  2:38 PM   Removal:  Spontaneous   Appearance:  Intact   Placenta disposition:  Discarded            Labor Events:       labor: No     Labor Onset Date/Time:         Dilation Complete Date/Time:         Start Pushing Date/Time:       Rupture Date/Time:              Rupture type:           Fluid Amount:        Fluid Color:        Fluid Odor:        Membrane Status (PeriCalm): ARM (Artificial Rupture)      Rupture Date/Time (PeriCalm): 2017 09:00:00      Fluid Amount (PeriCalm): Moderate      Fluid Color (PeriCalm): Clear       steroids: None     Antibiotics given for GBS: No     Induction: none     Indications for induction:        Augmentation: amniotomy     Indications for augmentation:       Labor complications: None     Additional complications:          Cervical ripening:                     Delivery:      Episiotomy: None     Indication for Episiotomy:       Perineal Lacerations: 1st Repaired:  Yes   Periurethral Laceration: none Repaired:     Labial Laceration: none Repaired:     Sulcus Laceration: none Repaired:     Vaginal Laceration: Yes Repaired: Yes   Cervical Laceration: No Repaired:     Repair suture:       Repair # of packets: 1     Blood loss (ml): 198     Vaginal Sweep Performed: Yes      Surgicount Correct: Yes       Other providers:       Anesthesia    Method:  Epidural              Details (if applicable):  Trial of Labor      Categorization:      Priority:     Indications for :     Incision Type:       Additional  information:  Forceps:    Vacuum:    Breech:    Observed anomalies    Other (Comments):         Blanka De Leon MD, PhD  OBGYN, PGY-1

## 2017-02-16 NOTE — NURSING
Pt given L&D admit instructions. Pt verbalized understanding. Reviewed plan of care with pt. All questions answered with no further questions at this time. Pt transported to LDR room.

## 2017-02-16 NOTE — ANESTHESIA PREPROCEDURE EVALUATION
2017  Nati Billy is a 21 y.o., female.     at 39w5d admitted for regular frequent contractions. Patient has anemia and sickle cell trait. Patient has had normal pregnancy without complications.    Nati Billy is a 21 y.o. female     OB History    Para Term  AB SAB TAB Ectopic Multiple Living   1               # Outcome Date GA Lbr Duane/2nd Weight Sex Delivery Anes PTL Lv   1 Current                   Wt Readings from Last 1 Encounters:   17 0750 75.8 kg (167 lb)       BP Readings from Last 3 Encounters:   17 134/62   17 126/75   17 120/80       Patient Active Problem List   Diagnosis    Family history of sickle cell disease- in grandmother, brother is carrier    Encounter for supervision of normal first pregnancy in third trimester    Hemoglobin C trait    Normal labor       Past Surgical History   Procedure Laterality Date    Wrist surgery         Social History     Social History    Marital status: Single     Spouse name: N/A    Number of children: N/A    Years of education: N/A     Occupational History    Not on file.     Social History Main Topics    Smoking status: Never Smoker    Smokeless tobacco: Not on file    Alcohol use No    Drug use: No    Sexual activity: Yes     Birth control/ protection: None     Other Topics Concern    Not on file     Social History Narrative         Chemistry    No results found for: NA, K, CL, CO2, BUN, CREATININE, GLU No results found for: CALCIUM, ALKPHOS, AST, ALT, BILITOT         Lab Results   Component Value Date    WBC 12.86 (H) 2017    HGB 9.5 (L) 2017    HCT 28.7 (L) 2017    MCV 69 (L) 2017     (H) 2017       No results for input(s): INR, PROTIME, APTT in the last 72 hours.    Invalid input(s): PT              [facility] Anesthesia  Evaluation         Review of Systems  Anesthesia Hx:  Denies Family Hx of Anesthesia complications.   Denies Personal Hx of Anesthesia complications.   Hematology/Oncology:     Oncology Normal    -- Anemia: Hematology Comments: Sickle cell trait    Cardiovascular:  Cardiovascular Normal     Pulmonary:  Pulmonary Normal    Hepatic/GI:  Hepatic/GI Normal    Musculoskeletal:  Musculoskeletal Normal    Neurological:  Neurology Normal    Endocrine:  Endocrine Normal        Physical Exam  General:  Well nourished    Airway/Jaw/Neck:  Airway Findings: Mouth Opening: Normal Tongue: Normal       Chest/Lungs:  Chest/Lungs Findings: Normal Respiratory Rate     Heart/Vascular:  Heart Findings: Rate: Normal  Rhythm: Regular Rhythm        Mental Status:  Mental Status Findings:  Cooperative, Alert and Oriented         Anesthesia Plan  Type of Anesthesia, risks & benefits discussed:  Anesthesia Type:  epidural, CSE, general, spinal  Patient's Preference:   Intra-op Monitoring Plan:   Intra-op Monitoring Plan Comments:   Post Op Pain Control Plan:   Post Op Pain Control Plan Comments:   Induction:    Beta Blocker:  Patient is not currently on a Beta-Blocker (No further documentation required).       Informed Consent: Patient understands risks and agrees with Anesthesia plan.  Questions answered. Anesthesia consent signed with patient.  ASA Score: 2     Day of Surgery Review of History & Physical:            Ready For Surgery From Anesthesia Perspective.

## 2017-02-16 NOTE — IP AVS SNAPSHOT
[facility] Location ([facility])  [address] [place], [place] [id number]  Phone: [phone]           Patient Discharge Instructions     Our goal is to set you up for success. This packet includes information on your condition, medications, and your home care. It will help you to care for yourself so you don't get sicker and need to go back to the hospital.     Please ask your nurse if you have any questions.        There are many details to remember when preparing to leave the hospital. Here is what you will need to do:    1. Take your medicine. If you are prescribed medications, review your Medication List in the following pages. You may have new medications to  at the pharmacy and others that you'll need to stop taking. Review the instructions for how and when to take your medications. Talk with your doctor or nurses if you are unsure of what to do.     2. Go to your follow-up appointments. Specific follow-up information is listed in the following pages. Your may be contacted by a transition nurse or clinical provider about future appointments. Be sure we have all of the phone numbers to reach you, if needed. Please contact your provider's office if you are unable to make an appointment.     3. Watch for warning signs. Your doctor or nurse will give you detailed warning signs to watch for and when to call for assistance. These instructions may also include educational information about your condition. If you experience any of warning signs to your health, call your doctor.               [facility]  Unless otherwise directed by your provider, please contact [facility], our nurse care line that is available for 24/7 assistance.     [phone] (toll-free)    Registered nurses in the [facility] provide clinical advisement, health education, appointment booking, and other advisory services.                    ** Verify the list of medication(s) below is accurate and up to  date. Carry this with you in case of emergency. If your medications have changed, please notify your healthcare provider.             Medication List      START taking these medications        Additional Info                      docusate sodium 100 MG capsule   Commonly known as:  COLACE   Refills:  0   Dose:  100 mg    Last time this was given:  100 mg on 2/18/2017  9:31 AM   Instructions:  Take 1 capsule (100 mg total) by mouth 2 (two) times daily as needed for Constipation.     Begin Date    AM    Noon    PM    Bedtime       iron polysaccharides 150 mg iron Cap   Commonly known as:  NIFEREX   Quantity:  60 capsule   Refills:  0   Dose:  150 mg    Last time this was given:  150 mg on 2/18/2017  9:31 AM   Instructions:  Take 1 capsule (150 mg total) by mouth 2 (two) times daily.     Begin Date    AM    Noon    PM    Bedtime       naproxen 500 MG tablet   Commonly known as:  NAPROSYN   Quantity:  40 tablet   Refills:  0   Dose:  500 mg    Last time this was given:  500 mg on 2/18/2017  3:08 AM   Instructions:  Take 1 tablet (500 mg total) by mouth every 8 (eight) hours as needed (Cramping).     Begin Date    AM    Noon    PM    Bedtime       norethindrone 0.35 mg tablet   Commonly known as:  MICRONOR   Quantity:  30 tablet   Refills:  12   Dose:  1 tablet    Instructions:  Take 1 tablet (0.35 mg total) by mouth once daily.     Begin Date    AM    Noon    PM    Bedtime       oxycodone-acetaminophen 5-325 mg per tablet   Commonly known as:  PERCOCET   Quantity:  30 tablet   Refills:  0   Dose:  1 tablet    Last time this was given:  1 tablet on 2/17/2017  5:22 PM   Instructions:  Take 1 tablet by mouth every 4 (four) hours as needed.     Begin Date    AM    Noon    PM    Bedtime         CONTINUE taking these medications        Additional Info                      PRENATAL + DHA ORAL   Refills:  0    Instructions:  Take by mouth.     Begin Date    AM    Noon    PM    Bedtime         STOP taking these medications      ferrous sulfate 325 mg (65 mg iron) Tab tablet            Where to Get Your Medications      These medications were sent to [id number] - [place], [place] - [address] AT [facility] [address]  [address], [facility] [address]     Phone:  [phone]     iron polysaccharides 150 mg iron Cap    naproxen 500 MG tablet    norethindrone 0.35 mg tablet         You can get these medications from any pharmacy     Bring a paper prescription for each of these medications     oxycodone-acetaminophen 5-325 mg per tablet       You don't need a prescription for these medications     docusate sodium 100 MG capsule                  Please bring to all follow up appointments:    1. A copy of your discharge instructions.  2. All medicines you are currently taking in their original bottles.  3. Identification and insurance card.    Please arrive 15 minutes ahead of scheduled appointment time.    Please call 24 hours in advance if you must reschedule your appointment and/or time.        Follow-up Information     Follow up with Suze Odonnell MD In 6 weeks.    Specialty:  Obstetrics and Gynecology    Why:  post partum    Contact information:    [address] [facility] [id number] [phone]          Discharge Instructions     Future Orders    Activity as tolerated     Call MD for:  difficulty breathing or increased cough     Call MD for:  increased confusion or weakness     Call MD for:  persistent dizziness, light-headedness, or visual disturbances     Call MD for:  persistent nausea and vomiting or diarrhea     Call MD for:  redness, tenderness, or signs of infection (pain, swelling, redness, odor or green/yellow discharge around incision site)     Call MD for:  severe persistent headache     Call MD for:  severe uncontrolled pain     Call MD for:  temperature >100.4     Call MD for:  worsening rash     Diet general     Questions:    Total calories:      Fat restriction, if any:       Protein restriction, if any:      Na restriction, if any:      Fluid restriction:      Additional restrictions:          Discharge Instructions       Breastfeeding Discharge Instructions       Feed the baby at the earliest sign of hunger or comfort  o Hands to mouth, sucking motions  o Rooting or searching for something to suck on  o Dont wait for crying - it is a late sign of hunger and comfort.     The feedings may be 8-12 times per 24hrs and will not follow a schedule   Avoid pacifiers and bottles for the first 4 weeks   Alternate the breast you start the feeding with, or start with the breast that feels the fullest   Switch breasts when the baby takes himself off the breast or falls asleep   Keep offering breasts until the baby looks full, no longer gives hunger signs, and stays asleep when placed on his back in the crib   If the baby is sleepy and wont wake for a feeding, put the baby skin-to-skin dressed in a diaper against the mothers bare chest   Sleep near your baby   The baby should be positioned and latched on to the breast correctly  o Chest-to-chest, chin in the breast  o Babys lips are flipped outward  o Babys mouth is stretched open wide like a shout  o Babys sucking should feel like tugging to the mother  - The baby should be drinking at the breast:  o You should hear swallowing or gulping throughout the feeding  o You should see milk on the babys lips when he comes off the breast  o Your breasts should be softer when the baby is finished feeding  o The baby should look relaxed at the end of feedings  o After the 4th day and your milk is in:  o The babys poop should turn bright yellow and be loose, watery, and seedy  o The baby should have at least 3-4 poops the size of the palm of your hand per day  o The baby should have at least 6-8 wet diapers per day  o The urine should be light yellow in color  You should drink when you are thirsty and eat a healthy diet when you are     hungry.     Take naps to get the rest you need.   Take medications and/or drink alcohol only with permission of your obstetrician    or the babys pediatrician.  You can also call the Infant Risk Center,   ([phone]), Monday-Friday, 8am-5pm Central time, to get the most   up-to-date evidence-based information on the use of medications during   pregnancy and breastfeeding.      The baby should be examined by a pediatrician at 3-5 days of age.  Once your   milk comes in, the baby should be gaining at least ½ - 1oz each day and should be back to birthweight no later than 10-14 days of age.          Community Resources    [facility] Breastfeeding Warmline:  [phone]  Local [facility] clinics: provide incentives and breastpumps to eligible mothers  La Leche League International (LLLI):  mother-to-mother support group website        www.[employer]UREX.[employer]  Local La Leche League mother-to-mother support groups:        www.Lama Lab        La Leche League [facility]         www.constantino@Myandb.com  Dr. Francisco Bowden website for latch videos and general information:        www.breastfeedinginc.ca  [facility] Risk [facility] is a call center that provides information about the safety of taking medications while breastfeeding.  Call [phone], M-F, 8am-5pm, [place].  International Lactation Consultant Association provides resources for assistance:        www.ilca.org  [facility] Breastfeeding Coalition provides informationand resources for parents  and the community    http://louisianabreastfeeding.org     Liliana mom provides resources for assistance:        www.nolamom.org  Partners for Healthy Babies:  9-202-583-BABY(3633)  [facility] provides a list of breastfeeding services by zip code:        www.[facility] Hospital[facility]gnyte.[employer]  Cafe au Lait:  737.583.5231 a breastfeeding support group for women of color.            Primary Diagnosis     Your primary diagnosis was:  Normal Vaginal Delivery      Admission Information     Date & Time  "Provider Department CSN    2/16/2017  3:58 AM Suze Odonnell MD [facility] [phone]      Care Providers     Provider Role Specialty Primary office phone    Suze Odonnell MD Attending Provider Obstetrics and Gynecology [phone]    Monse Lamb MD Consulting Physician  Obstetrics and Gynecology [phone]      Your Vitals Were     BP Pulse Temp Resp Height Weight    114/67 84 98.9 °F (37.2 °C) (Oral) 18 5' 4" (1.626 m) 75.8 kg (167 lb)    Last Period SpO2 BMI          05/14/2016 100% 28.67 kg/m2        Recent Lab Values     No lab values to display.      Allergies as of 2/18/2017     No Known Allergies      Advance Directives     An advance directive is a document which, in the event you are no longer able to make decisions for yourself, tells your healthcare team what kind of treatment you do or do not want to receive, or who you would like to make those decisions for you.  If you do not currently have an advance directive, [facility] encourages you to create one.  For more information call:  [phone]), [phone] ([phone]),  or log on to [email or web address].        Language Assistance Services     ATTENTION: Language assistance services are available, free of charge. Please call [phone].      ATENCIÓN: Si habla español, tiene a corrales disposición servicios gratuitos de asistencia lingüística. Llame al [phone].     CHÚ Ý: N?u b?n nói Ti?ng Vi?t, có các d?ch v? h? tr? ngôn ng? mi?n phí dành cho b?n. G?i s? [phone].         [facility] complies with applicable Federal civil rights laws and does not discriminate on the basis of race, color, national origin, age, disability, or sex.        "

## 2017-02-16 NOTE — ED PROVIDER NOTES
Encounter Date: 2017       History     Chief Complaint   Patient presents with    Contractions     Review of patient's allergies indicates:  No Known Allergies  HPI Comments: Nati Billy is a 21 y.o. F at 39w5d presents complaining of contractions. Patient reports regular frequent contractions since 0300 this morning.   This IUP is complicated by Hgb C trait, family hx of SC disease and microcytic anemia. Patient denies vaginal bleeding, denies LOF. Fetal Movement: normal.    The history is provided by the patient.     History reviewed. No pertinent past medical history.  No past medical history pertinent negatives.  Past Surgical History   Procedure Laterality Date    Wrist surgery       History reviewed. No pertinent family history.  Social History   Substance Use Topics    Smoking status: Never Smoker    Smokeless tobacco: None    Alcohol use No     Review of Systems   Constitutional: Negative for fever.   HENT: Negative for sore throat.    Respiratory: Negative for shortness of breath.    Cardiovascular: Negative for chest pain.   Gastrointestinal: Negative for nausea.   Genitourinary: Negative for dysuria.   Musculoskeletal: Negative for back pain.   Skin: Negative for rash.   Neurological: Negative for weakness.   Hematological: Does not bruise/bleed easily.       Physical Exam   Initial Vitals   BP Pulse Resp Temp SpO2   17 0404 17 0404 17 0404 17 0404 17 0404   131/74 102 19 97.5 °F (36.4 °C) 100 %     Physical Exam    Nursing note and vitals reviewed.  Constitutional: She appears well-developed and well-nourished. She is not diaphoretic. No distress.   HENT:   Head: Normocephalic and atraumatic.   Eyes: Conjunctivae and EOM are normal.   Neck: Normal range of motion.   Cardiovascular: Normal rate.   Pulmonary/Chest: No respiratory distress.   Musculoskeletal: Normal range of motion.   Neurological: She is alert and oriented to person, place, and  time.   Skin: Skin is warm and dry.   Psychiatric: She has a normal mood and affect.     OB LABOR EXAM:   Pre-Term Labor: No.   Membranes ruptured: No.   Method: Sterile vaginal exam per MD.   Vaginal Bleeding: bloody show.   Engagement: engaged.       Amniotic Fluid Color: no fluid.     Comments: FHT: reassuring, 145bpm, mod BTB variability, + accels, -decels  TOCO: q3-5min  SVE 1.5/90/-3 --> recheck 2 hrs 3/90/-3, bulging bag       ED Course   US - ED Performed - OB Limited 1 or More Gestations  Date/Time: 2017 7:14 AM  Performed by: JASON DIAZ  Authorized by: AUGUSTA HENDERSON   Comments: Cephalic        Labs Reviewed             Medical Decision Making:   Initial Assessment:   22 yo  at 39.5 with contractions  Differential Diagnosis:   Latent vs active labor  ED Management:  Changed on recheck 1.5/90/-3 --> 3/-3 bulging bag  Admit to L & D  Other:   I discussed test(s) with the performing physician.              Attending Attestation:   Physician Attestation Statement for Resident:  As the supervising MD   Physician Attestation Statement: I have personally seen and examined this patient.   I agree with the above history. -:   As the supervising MD I agree with the above PE.    As the supervising MD I agree with the above treatment, course, plan, and disposition.   -:   NST  I independently reviewed the fetal non-stress test with the following interpretation:  145 BPM baseline  Variability: moderate  Accelerations: present  Decelerations: absent  Contractions: Q 3-5 min  Category 1    Clinical Interpretation: reactive    Patient evaluated and found to be stable, agree with resident's assessment and plan to admit in early labor.  I was personally present during the critical portions of the procedure(s) performed by the resident and was immediately available in the ED to provide services and assistance as needed during the entire procedure.                    ED Course     Clinical Impression:    The encounter diagnosis was Normal labor.    Disposition:   Disposition: Admitted       Sheron Cleary MD  Resident  02/16/17 0715       Carolina Dowell MD  02/23/17 1506

## 2017-02-16 NOTE — ANESTHESIA RELEASE NOTE
"Anesthesia Release from PACU Note    Patient: Nati Billy    Procedure(s) Performed: * No procedures listed *    Anesthesia type: epidural    Post pain: Adequate analgesia    Post assessment: no apparent anesthetic complications and tolerated procedure well    Last Vitals:   Visit Vitals    BP (!) 122/59    Pulse 82    Temp 36.7 °C (98.1 °F)    Resp 18    Ht 5' 4" (1.626 m)    Wt 75.8 kg (167 lb)    LMP 05/14/2016    SpO2 100%    Breastfeeding Unknown    BMI 28.67 kg/m2       Post vital signs: stable    Level of consciousness: awake, alert  and oriented    Nausea/Vomiting: no nausea/no vomiting    Complications: none    Airway Patency: patent    Respiratory: unassisted, spontaneous ventilation, room air    Cardiovascular: stable and blood pressure at baseline    Hydration: euvolemic  "

## 2017-02-16 NOTE — ANESTHESIA PROCEDURE NOTES
Epidural    Patient location during procedure: OB   Reason for block: primary anesthetic   Diagnosis: iup   Start time: 2/16/2017 7:27 AM  Timeout: 2/16/2017 7:27 AM  End time: 2/16/2017 8:08 AM  Staffing  Anesthesiologist: KAITLYN CHAUHAN  Resident/CRNA: TRAN PEREZ  Performed by: anesthesiologist   Preanesthetic Checklist  Completed: patient identified, site marked, surgical consent, pre-op evaluation, timeout performed, IV checked, risks and benefits discussed, monitors and equipment checked, anesthesia consent given, hand hygiene performed and patient being monitored  Preparation  Patient position: sitting  Prep: ChloraPrep  Patient monitoring: Pulse Ox and Blood Pressure  Epidural  Skin Anesthetic: lidocaine 1%  Skin Wheal: 3 mL  Administration type: continuous  Approach: midline  Interspace: L3-4  Injection technique: BERKLEY saline  Needle and Epidural Catheter  Needle type: Tuohy   Needle gauge: 17  Needle length: 3.5 inches  Needle insertion depth: 8 cm  Catheter type: springwound and multi-orifice  Catheter size: 19 G  Catheter at skin depth: 12 cm  Test dose: 3 mL of lidocaine 1.5% with Epi 1-to-200,000  Additional Documentation: incremental injection, negative aspiration for heme and CSF and no paresthesia on injection  Needle localization: anatomical landmarks  Medications:  Bolus administered: 10 mL of 0.125% bupivacaine  Opioid administered: 100 mcg of   fentanyl  Volume per aspiration: 5 mL  Time between aspirations: 3 minutes  Assessment  Ease of block: difficult

## 2017-02-16 NOTE — PROGRESS NOTES
S: 21 y.o.  at 39w5d with EDC of 2017, by Last Menstrual Period.     O:   BP: (!) 145/67    FHT: 150, moderate variability  [employer]/IUPC: Q3 minutes  SVE:ant lip/+1      ASSESSMENT: 39w5d   Patient Active Problem List   Diagnosis    Family history of sickle cell disease- in grandmother, brother is carrier    Encounter for supervision of normal first pregnancy in third trimester    Hemoglobin C trait    Normal labor       PLAN:    Continue Close Maternal/Fetal Monitoring  Pitocin Augmentation per protocol  Recheck 2 hours or PRN    Suze Odonnell

## 2017-02-16 NOTE — PROGRESS NOTES
LABOR PROGRESS NOTE    S:  MD to bedside for cervical check. Complaints: No.  Epidural in place and working. Patient noted to be having small variables prior to exam.    O: Temp:  [95.9 °F (35.5 °C)-97.5 °F (36.4 °C)] 97.3 °F (36.3 °C)  Pulse:  [] 99  Resp:  [19] 19  SpO2:  [95 %-100 %] 98 %  BP: (131-134)/(62-74) 134/62    FHT: 145 BPM/moderate beat to beat variability/+accels/+ small variable decels Cat 2 (reassuring)  CTX: difficult to discern, irritability noted on TOCO  SVE: /-2    AROM, clear, IUPC placed; moderate blood on exam  Placenta is anterior per U/S; IUPC placed posterior without resistance/difficulty  Fetal deceleration to 70s lasting 7 minutes  Anesthesia called to the room for BP in the 80s/50s  Maternal repositioning and O2  Anesthesia pushed phenylephrine at bedside with rapid recovery to BP 110s/80s  Following decel:    FHT: 150 BPM/moderate beat to beat variability/+accels/- decels Cat 1 (reassuring)  CTX: q6min  SVE: /-2    ASSESSMENT:   21 y.o.  at 39w5d, in labor at term    FHT reassuring    Active Hospital Problems    Diagnosis  POA    *Normal labor [O80, Z37.9]  Not Applicable      Resolved Hospital Problems    Diagnosis Date Resolved POA   No resolved problems to display.     PLAN:    Labor management  Continue Close Maternal/Fetal Monitoring.   Pitocin Augmentation per protocol if ctx remained spaced q6min  AROM, clear  IUPC in place and working  Recheck 2 hours or PRN  Staff notified    Blanka De Leon MD, PhD  OBGYN, PGY-1

## 2017-02-17 PROBLEM — Z37.9 NORMAL LABOR: Status: RESOLVED | Noted: 2017-02-16 | Resolved: 2017-02-17

## 2017-02-17 LAB
ANISOCYTOSIS BLD QL SMEAR: SLIGHT
BASOPHILS # BLD AUTO: 0.02 K/UL
BASOPHILS NFR BLD: 0.1 %
DACRYOCYTES BLD QL SMEAR: ABNORMAL
DIFFERENTIAL METHOD: ABNORMAL
EOSINOPHIL # BLD AUTO: 0 K/UL
EOSINOPHIL NFR BLD: 0.2 %
ERYTHROCYTE [DISTWIDTH] IN BLOOD BY AUTOMATED COUNT: 21.4 %
GIANT PLATELETS BLD QL SMEAR: PRESENT
HCT VFR BLD AUTO: 23.3 %
HGB BLD-MCNC: 7.6 G/DL
HYPOCHROMIA BLD QL SMEAR: ABNORMAL
LYMPHOCYTES # BLD AUTO: 2.3 K/UL
LYMPHOCYTES NFR BLD: 14.4 %
MCH RBC QN AUTO: 22.1 PG
MCHC RBC AUTO-ENTMCNC: 32.6 %
MCV RBC AUTO: 68 FL
MONOCYTES # BLD AUTO: 1.7 K/UL
MONOCYTES NFR BLD: 10.9 %
NEUTROPHILS # BLD AUTO: 11.7 K/UL
NEUTROPHILS NFR BLD: 74.4 %
OVALOCYTES BLD QL SMEAR: ABNORMAL
PLATELET # BLD AUTO: 285 K/UL
PLATELET BLD QL SMEAR: ABNORMAL
PMV BLD AUTO: 9 FL
POIKILOCYTOSIS BLD QL SMEAR: SLIGHT
POLYCHROMASIA BLD QL SMEAR: ABNORMAL
RBC # BLD AUTO: 3.44 M/UL
SCHISTOCYTES BLD QL SMEAR: PRESENT
TARGETS BLD QL SMEAR: ABNORMAL
WBC # BLD AUTO: 15.83 K/UL

## 2017-02-17 PROCEDURE — 25000003 PHARM REV CODE 250: Performed by: STUDENT IN AN ORGANIZED HEALTH CARE EDUCATION/TRAINING PROGRAM

## 2017-02-17 PROCEDURE — 85025 COMPLETE CBC W/AUTO DIFF WBC: CPT

## 2017-02-17 PROCEDURE — 36415 COLL VENOUS BLD VENIPUNCTURE: CPT

## 2017-02-17 PROCEDURE — 11000001 HC ACUTE MED/SURG PRIVATE ROOM

## 2017-02-17 RX ORDER — NAPROXEN 500 MG/1
500 TABLET ORAL EVERY 8 HOURS PRN
Qty: 40 TABLET | Refills: 0 | Status: SHIPPED | OUTPATIENT
Start: 2017-02-17 | End: 2018-07-10

## 2017-02-17 RX ORDER — IRON POLYSACCHARIDE COMPLEX 150 MG
150 CAPSULE ORAL 2 TIMES DAILY
Status: DISCONTINUED | OUTPATIENT
Start: 2017-02-17 | End: 2017-02-18 | Stop reason: HOSPADM

## 2017-02-17 RX ORDER — IRON POLYSACCHARIDE COMPLEX 150 MG
150 CAPSULE ORAL 2 TIMES DAILY
Qty: 60 CAPSULE | Refills: 0 | Status: SHIPPED | OUTPATIENT
Start: 2017-02-17 | End: 2018-07-10

## 2017-02-17 RX ORDER — OXYCODONE AND ACETAMINOPHEN 5; 325 MG/1; MG/1
1 TABLET ORAL EVERY 4 HOURS PRN
Qty: 30 TABLET | Refills: 0 | Status: SHIPPED | OUTPATIENT
Start: 2017-02-17 | End: 2018-07-10

## 2017-02-17 RX ORDER — DOCUSATE SODIUM 100 MG/1
100 CAPSULE, LIQUID FILLED ORAL 2 TIMES DAILY PRN
Refills: 0 | COMMUNITY
Start: 2017-02-17 | End: 2018-07-10

## 2017-02-17 RX ORDER — ACETAMINOPHEN AND CODEINE PHOSPHATE 120; 12 MG/5ML; MG/5ML
1 SOLUTION ORAL DAILY
Qty: 30 TABLET | Refills: 12 | Status: SHIPPED | OUTPATIENT
Start: 2017-02-17 | End: 2019-03-11

## 2017-02-17 RX ADMIN — Medication 150 MG: at 11:02

## 2017-02-17 RX ADMIN — NAPROXEN 500 MG: 500 TABLET ORAL at 06:02

## 2017-02-17 RX ADMIN — OXYCODONE AND ACETAMINOPHEN 1 TABLET: 5; 325 TABLET ORAL at 03:02

## 2017-02-17 RX ADMIN — NAPROXEN 500 MG: 500 TABLET ORAL at 05:02

## 2017-02-17 RX ADMIN — Medication 150 MG: at 10:02

## 2017-02-17 RX ADMIN — STANDARDIZED SENNA CONCENTRATE AND DOCUSATE SODIUM 1 TABLET: 8.6; 5 TABLET, FILM COATED ORAL at 10:02

## 2017-02-17 RX ADMIN — OXYCODONE AND ACETAMINOPHEN 1 TABLET: 5; 325 TABLET ORAL at 05:02

## 2017-02-17 RX ADMIN — OXYCODONE AND ACETAMINOPHEN 1 TABLET: 5; 325 TABLET ORAL at 07:02

## 2017-02-17 NOTE — DISCHARGE INSTRUCTIONS
Breastfeeding Discharge Instructions       Feed the baby at the earliest sign of hunger or comfort  o Hands to mouth, sucking motions  o Rooting or searching for something to suck on  o Dont wait for crying - it is a late sign of hunger and comfort.     The feedings may be 8-12 times per 24hrs and will not follow a schedule   Avoid pacifiers and bottles for the first 4 weeks   Alternate the breast you start the feeding with, or start with the breast that feels the fullest   Switch breasts when the baby takes himself off the breast or falls asleep   Keep offering breasts until the baby looks full, no longer gives hunger signs, and stays asleep when placed on his back in the crib   If the baby is sleepy and wont wake for a feeding, put the baby skin-to-skin dressed in a diaper against the mothers bare chest   Sleep near your baby   The baby should be positioned and latched on to the breast correctly  o Chest-to-chest, chin in the breast  o Babys lips are flipped outward  o Babys mouth is stretched open wide like a shout  o Babys sucking should feel like tugging to the mother  - The baby should be drinking at the breast:  o You should hear swallowing or gulping throughout the feeding  o You should see milk on the babys lips when he comes off the breast  o Your breasts should be softer when the baby is finished feeding  o The baby should look relaxed at the end of feedings  o After the 4th day and your milk is in:  o The babys poop should turn bright yellow and be loose, watery, and seedy  o The baby should have at least 3-4 poops the size of the palm of your hand per day  o The baby should have at least 6-8 wet diapers per day  o The urine should be light yellow in color  You should drink when you are thirsty and eat a healthy diet when you are    hungry.     Take naps to get the rest you need.   Take medications and/or drink alcohol only with permission of your obstetrician    or the babys  pediatrician.  You can also call the Infant Risk Center,   ([phone]), Monday-Friday, 8am-5pm Central time, to get the most   up-to-date evidence-based information on the use of medications during   pregnancy and breastfeeding.      The baby should be examined by a pediatrician at 3-5 days of age.  Once your   milk comes in, the baby should be gaining at least ½ - 1oz each day and should be back to birthweight no later than 10-14 days of age.          Community Resources    [facility] Breastfeeding Warmline:  [phone]  Local [facility] clinics: provide incentives and breastpumps to eligible mothers  La Leche League International (LLLI):  mother-to-mother support group website        www.Fotoup.Olive Media  Local La Leche League mother-to-mother support groups:        www.Modacruz.[place]        La Leche League [facility]         www.constantino@[employer]fter.com  Dr. Francisco Bowden website for latch videos and general information:        www.breastfeedinginc.ca  [facility] Risk [facility] is a call center that provides information about the safety of taking medications while breastfeeding.  Call [phone], M-F, 8am-5pm, [place].  International Lactation Consultant Association provides resources for assistance:        www.ilca.org  [place] Breastfeeding Coalition provides informationand resources for parents  and the community    http://louisNemours Foundationbreastfeeding.org     [place] mom provides resources for assistance:        www.nolamom.org  [employer] for Healthy Babies:  7-571-215-BABY(9278)  [facility] provides a list of breastfeeding services by zip code:        www.[facility] HospitalUtiliData.Olive Media  Cafe [employer] Lait:  [phone] a breastfeeding support group for women of color.

## 2017-02-17 NOTE — LACTATION NOTE
"   02/17/17 1010   Maternal Infant Assessment   Breast Density soft   Nipple(s) Right:;everted;Left:;flat   Infant Assessment   Sucking Reflex present   Rooting Reflex present   Swallow Reflex present   LATCH Score   Latch 1-->repeated attempts, holds nipple in mouth, stimulate to suck   Audible Swallowing 2-->spontaneous and intermittent (24 hrs old)   Type Of Nipple 2-->everted (after stimulation)   Comfort (Breast/Nipple) 2-->soft/nontender   Hold (Positioning) 1-->minimal assist, teach one side: mother does other, staff holds   Score (less than 7 for 2/more consecutive times, consult Lactation Consultant) 8   Maternal Infant Feeding   Infant Positioning clutch/"football"   Signs of Milk Transfer audible swallow   Time Spent (min) 15-30 min   Latch Assistance yes   Breastfeeding History   Currently Breastfeeding yes   Feeding Infant   Effective Latch During Feeding yes   Audible Swallow yes   Suck/Swallow Coordination present   Lactation Referrals   Lactation Consult Breastfeeding assessment;Follow up   Lactation Interventions   Attachment Promotion breastfeeding assistance provided;counseling provided   Breastfeeding Assistance assisted with positioning;feeding cue recognition promoted;infant latch-on verified;infant suck/swallow verified;support offered   Maternal Breastfeeding Support lactation counseling provided   Latch Promotion positioning assisted;infant moved to breast;suck stimulated with colostrum drop   assisted pt with latch. Several attempts needed for baby to sustain latch and rhythmically suck. Pt c/o tender nipples lanolin ointment given. Pt states having difficulty with latch to left breast. Encouraged pt to call for assistance. Breast shells given to pt but pt does not have bra at [facility]. Encouraged pt to have family member bring bra to hospital and to call when she is ready to use shells.  "
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
## 2017-02-17 NOTE — PROGRESS NOTES
POSTPARTUM PROGRESS NOTE     Nati Billy is a 21 y.o. female PPD #1 status post Spontaneous vaginal delivery at 39w5d in a pregnancy complicated by Hgb C trait, family hx of SC disease and microcytic anemia.     Patient is doing well this morning. She denies nausea, vomiting, fever or chills.  Patient reports mild abdominal pain that is well relieved by oral pain medications. Lochia is mild to moderate  and stable. Patient is voiding without difficulty and ambulating with no difficulty. She has passed flatus, and has not had BM. Patient does plan to breast feed. Did not discuss contraception. Patient is considering discharge to home today.    Objective:       Temp:  [97.2 °F (36.2 °C)-99 °F (37.2 °C)] 98.1 °F (36.7 °C)  Pulse:  [] 68  Resp:  [18] 18  SpO2:  [66 %-100 %] 99 %  BP: ()/(44-93) 96/44    General:   alert, appears stated age and cooperative   Lungs:   clear to auscultation bilaterally   Heart:   regular rate and rhythm, S1, S2 normal, no murmur, click, rub or gallop   Abdomen:  soft, non-tender; bowel sounds normal; no masses,  no organomegaly   Uterus:  firm located at the umblicus.    Extremities: peripheral pulses normal, no pedal edema, no clubbing or cyanosis     Lab Review  No results found for this or any previous visit (from the past 4 hour(s)).    I/O    Intake/Output Summary (Last 24 hours) at 17 0642  Last data filed at 17 0300   Gross per 24 hour   Intake          4171.16 ml   Output             2198 ml   Net          1973.16 ml        Assessment:     Patient Active Problem List   Diagnosis    Family history of sickle cell disease- in grandmother, brother is carrier    Encounter for supervision of normal first pregnancy in third trimester    Hemoglobin C trait    Normal labor     (spontaneous vaginal delivery)        Plan:   1. Postpartum care:  - Patient doing well. Continue routine management and advances.  - Continue PO pain meds. Pain well  controlled.  - Heme: H/h     Recent Labs  Lab 02/16/17  0730   WBC 12.86*   HGB 9.5*   HCT 28.7*   MCV 69*   *     - Rh positive  - Encourage ambulation  - Contraception not discussed  - Lactation consult PRN  - patient considering d/c to home today    2. Acute ABLA on chronic microcytic anemia  - AM CBC pending  - will start Fe, colace  - patient is asymptomatic with VSS  -  cc      Dispo: As patient meets milestones, will plan to discharge PPD #1-2.    Blanka De Leon MD, PhD  OBGYN, PGY-1

## 2017-02-17 NOTE — LACTATION NOTE
"   02/16/17 1800   Maternal Infant Assessment   Breast Shape pendulous   Breast Density soft   Areola elastic   LATCH Score   Latch 1-->repeated attempts, holds nipple in mouth, stimulate to suck   Audible Swallowing 1-->a few with stimulation   Type Of Nipple 2-->everted (after stimulation)   Comfort (Breast/Nipple) 2-->soft/nontender   Hold (Positioning) 1-->minimal assist, teach one side: mother does other, staff holds   Score (less than 7 for 2/more consecutive times, consult Lactation Consultant) 7   Maternal Infant Feeding   Infant Positioning clutch/"football"   Signs of Milk Transfer audible swallow;infant jaw motion present;breasts soften with feeding   Time Spent (min) 0-15 min   Latch Assistance yes   Feeding Infant   Effective Latch During Feeding yes   Lactation Interventions   Attachment Promotion face-to-face positioning promoted;skin-to-skin contact encouraged;breastfeeding assistance provided;infant-mother separation minimized   Breast Care: Breastfeeding frequency of feedings adjusted   Breastfeeding Assistance infant latch-on verified;infant suck/swallow verified   Maternal Breastfeeding Support lactation counseling provided   LC first visit with mother. Reviewed breastfeeding basics and gave mother LC packet. Showed mother where to record feedings and diapers. Baby rooting. Asst mother getting baby on in football on right side. Baby did good pulls and tugs.Report to RN  "

## 2017-02-17 NOTE — ANESTHESIA POSTPROCEDURE EVALUATION
"Anesthesia Post Evaluation    Patient: Nati Billy    Procedure(s) Performed: * No procedures listed *    Final Anesthesia Type: epidural  Patient location during evaluation: labor & delivery  Patient participation: Yes- Able to Participate  Level of consciousness: awake and alert and oriented  Post-procedure vital signs: reviewed and stable  Pain management: adequate  Airway patency: patent  PONV status at discharge: No PONV  Anesthetic complications: no      Cardiovascular status: blood pressure returned to baseline and hemodynamically stable  Respiratory status: spontaneous ventilation and room air  Hydration status: euvolemic  Follow-up not needed.        Visit Vitals    /63 (Patient Position: Sitting, BP Method: Automatic)    Pulse 81    Temp 36.8 °C (98.2 °F) (Oral)    Resp 18    Ht 5' 4" (1.626 m)    Wt 75.8 kg (167 lb)    LMP 05/14/2016    SpO2 99%    Breastfeeding Yes    BMI 28.67 kg/m2       Pain/David Score: Pain Rating Prior to Med Admin: 6 (2/17/2017  7:37 AM)  Pain Rating Post Med Admin: 2 (2/17/2017  8:35 AM)      "

## 2017-02-17 NOTE — PLAN OF CARE
Problem: Patient Care Overview  Goal: Plan of Care Review  Outcome: Ongoing (interventions implemented as appropriate)  Mother will breastfeed baby 8 or more times in 24 hours on baby's cue until content. Ensure a deep latch at each feeding that feels like a pull and tug. Latch should not be painful but may be tender. Observe for signs of milk transfer such as audible swallows and chin pauses during feeding. Mother should keep baby active at the breast by using compression of breast and stimulating baby throughout feeding.  Mother should use her feeding log to keep track of baby's intake and output. Mother should avoid bottles and pacifiers. Call for asst as needed.

## 2017-02-17 NOTE — PLAN OF CARE
Problem: Patient Care Overview  Goal: Plan of Care Review  Outcome: Ongoing (interventions implemented as appropriate)  Tolerating regular diet well. Ambulating and voiding without difficculty. Pain controlled with oral pain meds. moderate rubra. H/H 7.6/23.3.started on  NIferex.asymptomatic,brestfeeding infant.

## 2017-02-18 VITALS
TEMPERATURE: 99 F | BODY MASS INDEX: 28.51 KG/M2 | RESPIRATION RATE: 18 BRPM | SYSTOLIC BLOOD PRESSURE: 114 MMHG | HEART RATE: 84 BPM | DIASTOLIC BLOOD PRESSURE: 67 MMHG | OXYGEN SATURATION: 100 % | WEIGHT: 167 LBS | HEIGHT: 64 IN

## 2017-02-18 PROCEDURE — 25000003 PHARM REV CODE 250: Performed by: STUDENT IN AN ORGANIZED HEALTH CARE EDUCATION/TRAINING PROGRAM

## 2017-02-18 RX ADMIN — NAPROXEN 500 MG: 500 TABLET ORAL at 03:02

## 2017-02-18 RX ADMIN — DOCUSATE SODIUM 100 MG: 100 CAPSULE, LIQUID FILLED ORAL at 09:02

## 2017-02-18 RX ADMIN — Medication 150 MG: at 09:02

## 2017-02-18 NOTE — PLAN OF CARE
Problem: Patient Care Overview  Goal: Plan of Care Review  Outcome: Ongoing (interventions implemented as appropriate)  Lactation note:  Reviewed lactation discharge teaching using mother baby care guide and lactation packet. The patient was able to latch her infant to the breast independently but lactation consultant assisted with getting a deeper latch. Infant was nursing effectively and patient's breasts are filling. Encouraged nursing infant 8 or more times in 24 hours on cue until content. We discussed prevention and treatment of sore nipples and breast engorgement. The patient has lanolin to use as needed after nursing. The patient was taught how to perform hand expression and will call her insurance/WIC to see if they cover a breast pump. The patient has the lactation phone number to call as needed. Additional resources were placed on her AVS to be given at discharge.

## 2017-02-18 NOTE — PROGRESS NOTES
POSTPARTUM PROGRESS NOTE     Nati Billy is a 21 y.o. female PPD #2 status post Spontaneous vaginal delivery at 39w5d in a pregnancy complicated by Hgb C trait, family hx of SC disease and microcytic anemia.     Patient is doing well this morning. She denies nausea, vomiting, fever or chills.  Patient reports mild abdominal pain that is well relieved by oral pain medications. Lochia is mild to moderate  and stable. Patient is voiding without difficulty and ambulating with no difficulty. She has passed flatus but has not had a bowel movement. Patient does plan to breast feed. Did not discuss contraception.   Objective:       Temp:  [98.4 °F (36.9 °C)-98.9 °F (37.2 °C)] 98.9 °F (37.2 °C)  Pulse:  [77-84] 84  Resp:  [18] 18  SpO2:  [100 %] 100 %  BP: (110-133)/(56-67) 114/67    General:   alert, appears stated age and cooperative   Lungs:   clear to auscultation bilaterally   Heart:   regular rate and rhythm, S1, S2 normal, no murmur, click, rub or gallop   Abdomen:  soft, non-tender; bowel sounds normal; no masses,  no organomegaly   Uterus:  firm located at the umblicus.    Extremities: peripheral pulses normal, no pedal edema, no clubbing or cyanosis     Lab Review  No results found for this or any previous visit (from the past 4 hour(s)).    I/O  No intake or output data in the 24 hours ending 17 0932     Assessment:     Patient Active Problem List   Diagnosis    Family history of sickle cell disease- in grandmother, brother is carrier    Hemoglobin C trait     (spontaneous vaginal delivery)        Plan:   1. Postpartum care:  - Patient doing well. Continue routine management and advances.  - Continue PO pain meds. Pain well controlled.  - Heme: H/h     Recent Labs  Lab 17  0730 17  0550   WBC 12.86* 15.83*   HGB 9.5* 7.6*   HCT 28.7* 23.3*   MCV 69* 68*   * 285     - Rh positive  - Encourage ambulation  - Contraception not discussed  - Lactation consult PRN  -  patient considering d/c to home today    2. Acute ABLA on chronic microcytic anemia  - CBC: 7.6/23/285  - Continue iron and colace  - patient is asymptomatic with VSS  -  cc    3. Social Work Consult  - h/o inpatient psych admission 2/2 self harm  - Currently with consistent counseling at [facility]    Dispo: As patient meets milestones, will plan to discharge today after staff rounds.    Lois Zepeda MD  PGY 3 OB/GYN  [phone]
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
## 2017-02-21 ENCOUNTER — TELEPHONE (OUTPATIENT)
Dept: LACTATION | Facility: CLINIC | Age: 22
End: 2017-02-21

## 2017-02-21 NOTE — TELEPHONE ENCOUNTER
Lactation Note: Patient reports her milk is in and baby is nursing 8 or more feedings/24 hours and is having >3-4 medium-large stools and at least 5-6 wet diapers in 24 hours. She also stated that her nipples are no longer sore. Verbalized no questions or concerns. Baby has first check up with pediatrician tomorrow at 11:30 a.m. Encouraged to call warm line after appointment if any concerns with weight or any further questions or concerns. Has warm line number.

## 2017-02-21 NOTE — SIGNIFICANT EVENT
Copied from baby's chart:  _______________________________  Meconium resulted + THC therefore [facility] [phone]. Report taken by Melissa Chen # [id number]. Written report will be faxed in as well.    Meredith Juan LCSW    [facility] [place]  [email or web address]    (phone) [phone] or  Csx. [phone]  (fax) [phone]

## 2017-03-16 ENCOUNTER — TELEPHONE (OUTPATIENT)
Dept: OBSTETRICS AND GYNECOLOGY | Facility: CLINIC | Age: 22
End: 2017-03-16

## 2017-04-17 ENCOUNTER — PATIENT MESSAGE (OUTPATIENT)
Dept: OBSTETRICS AND GYNECOLOGY | Facility: CLINIC | Age: 22
End: 2017-04-17

## 2017-06-08 ENCOUNTER — PATIENT MESSAGE (OUTPATIENT)
Dept: FAMILY MEDICINE | Facility: CLINIC | Age: 22
End: 2017-06-08

## 2018-07-10 ENCOUNTER — OFFICE VISIT (OUTPATIENT)
Dept: URGENT CARE | Facility: CLINIC | Age: 23
End: 2018-07-10
Payer: MEDICAID

## 2018-07-10 VITALS
HEIGHT: 64 IN | DIASTOLIC BLOOD PRESSURE: 58 MMHG | OXYGEN SATURATION: 98 % | SYSTOLIC BLOOD PRESSURE: 111 MMHG | WEIGHT: 148 LBS | TEMPERATURE: 100 F | RESPIRATION RATE: 16 BRPM | BODY MASS INDEX: 25.27 KG/M2 | HEART RATE: 91 BPM

## 2018-07-10 DIAGNOSIS — M75.42 SHOULDER IMPINGEMENT SYNDROME, LEFT: ICD-10-CM

## 2018-07-10 DIAGNOSIS — Z00.00 HEALTHCARE MAINTENANCE: Primary | ICD-10-CM

## 2018-07-10 PROCEDURE — 99214 OFFICE O/P EST MOD 30 MIN: CPT | Mod: S$GLB,,, | Performed by: NURSE PRACTITIONER

## 2018-07-10 RX ORDER — DEXAMETHASONE SODIUM PHOSPHATE 100 MG/10ML
6 INJECTION INTRAMUSCULAR; INTRAVENOUS
Status: COMPLETED | OUTPATIENT
Start: 2018-07-10 | End: 2018-07-10

## 2018-07-10 RX ORDER — IBUPROFEN 800 MG/1
800 TABLET ORAL EVERY 8 HOURS PRN
Qty: 60 TABLET | Refills: 2 | Status: SHIPPED | OUTPATIENT
Start: 2018-07-10 | End: 2019-03-11

## 2018-07-10 RX ADMIN — DEXAMETHASONE SODIUM PHOSPHATE 6 MG: 100 INJECTION INTRAMUSCULAR; INTRAVENOUS at 01:07

## 2018-07-10 NOTE — PATIENT INSTRUCTIONS
Understanding Shoulder Impingement Syndrome    Shoulder impingement syndrome is a problem with the shoulder joint. It occurs when certain parts within the joint swell and are pinched. This can cause nagging pain and problems with moving the arm.  What causes shoulder impingement syndrome?  It is possible to develop impingement after years of normal shoulder use. But in most cases the condition occurs because of repeated overhead movements. These include such things as stocking shelves, painting, swimming, and throwing. These movements can irritate parts of the shoulder, leading to swelling. Swollen parts of the shoulder take up more room, making the joint space smaller. Some parts that may be involved include:  · A sac of fluid (bursa) that cushions the shoulder joint.  When the bursa is irritated, it may lead to a condition called bursitis. This is when the bursa swells with fluid, filling and squeezing the joint space.  · Fibrous tissues (tendons) that connect muscle to bone. When tendons are irritated, they may become swollen. This is called tendonitis.  · The end (acromion) of the shoulder blade. This bone may be flat or hooked. If the acromion is hooked, the joint space may be smaller than normal. Growths (bone spurs) on the acromion can also narrow the joint space. Acromion problems dont often cause impingement. But they can make it worse.  Symptoms of shoulder impingement syndrome  Symptoms include pain, pinching, or stiffness in your shoulder. Pain often comes with movement, particularly reaching overhead or backward. It may also be felt when the shoulder is at rest. Pain at night during sleep is common.  Treatment for shoulder impingement syndrome  Treatment will depend on the cause of the problem, how bad the problem is, and if other parts of the shoulder are damaged. Treatment may include the following:  · Active rest. This allows the shoulder to heal. It means using the arm and shoulder, but avoiding  activities that cause pain. These likely include reaching overhead or sleeping on your shoulder.  · Cold packs. These help reduce swelling and relieve pain.  · Prescription or over-the-counter pain medicines. These help relieve pain and swelling.  · Arm and shoulder exercises. These help keep your shoulder joint mobile as it heals. They also help improve muscle strength around the joint.  · Shots of medicine into the joint. This can help reduce swelling and pain for a short time.  If other measures dont work to relieve symptoms, you may need surgery. This opens up space in the joint to allow pain-free motion.  Possible complications of shoulder impingement syndrome  It might be tempting to stop using your shoulder completely to avoid pain. But doing so may lead to a condition called frozen shoulder. To help prevent this, follow instructions you are given for active rest and for doing exercises to help your shoulder heal.  When to call your healthcare provider  Call your healthcare provider right away if you have any of these:  · Fever of 100.4°F (38°C) or higher, or as directed  · Symptoms that dont get better, or get worse  · New symptoms   Date Last Reviewed: 3/10/2016  © 6530-6918 [employer]. [address] [place] [address]. All rights reserved. This information is not intended as a substitute for professional medical care. Always follow your healthcare professional's instructions.    Please drink plenty of fluids.  Please get plenty of rest.  Please return here or go to the Emergency Department for any concerns or worsening of condition.  If you were prescribed a narcotic medication, do not drive or operate heavy equipment or machinery while taking these medications.  If you were not prescribed an anti-inflammatory medication, and if you do not have any history of stomach/intestinal ulcers, or kidney disease, or are not taking a blood thinner such as Coumadin, Plavix, Pradaxa, Eloquis,  or Xaralta for example, it is OK to take over the counter Ibuprofen or Advil or Motrin or Aleve as directed.  Do not take these medications on an empty stomach.  Rest, ice, compression and elevation to the affected joint or limb as needed.  Please follow up with your primary care doctor or specialist as needed.    If you  smoke, please stop smoking.

## 2018-07-10 NOTE — PROGRESS NOTES
"Subjective:       Patient ID: Nati Billy is a 23 y.o. female.    Vitals:  height is 5' 4" (1.626 m) and weight is 67.1 kg (148 lb). Her temperature is 100.1 °F (37.8 °C). Her blood pressure is 111/58 (abnormal) and her pulse is 91. Her respiration is 16 and oxygen saturation is 98%.     Chief Complaint: Shoulder Pain    Shoulder Pain    The pain is present in the left shoulder. This is a new problem. The current episode started yesterday. There has been no history of extremity trauma. The problem occurs constantly. The problem has been unchanged. The quality of the pain is described as dull and aching. The pain is at a severity of 8/10. The pain is moderate. Associated symptoms include stiffness. Pertinent negatives include no fever, headaches or itching. The symptoms are aggravated by activity. She has tried NSAIDS, heat and cold for the symptoms. The treatment provided mild relief.     Review of Systems   Constitution: Negative for chills and fever.   HENT: Negative for sore throat.    Eyes: Negative for blurred vision.   Cardiovascular: Negative for chest pain.   Respiratory: Negative for shortness of breath.    Skin: Negative for itching and rash.   Musculoskeletal: Positive for joint pain and stiffness. Negative for back pain.   Gastrointestinal: Negative for abdominal pain, diarrhea, nausea and vomiting.   Genitourinary: Negative for dysuria, genital sores, hematuria, missed menses, non-menstrual bleeding and urgency.   Neurological: Negative for headaches.   Psychiatric/Behavioral: The patient is not nervous/anxious.    All other systems reviewed and are negative.      Objective:      Physical Exam   Constitutional: She is oriented to person, place, and time. She appears well-developed and well-nourished. She is cooperative.  Non-toxic appearance. She does not appear ill. No distress.   HENT:   Head: Normocephalic and atraumatic.   Right Ear: Hearing, tympanic membrane, external ear and ear " canal normal.   Left Ear: Hearing, tympanic membrane, external ear and ear canal normal.   Nose: Nose normal. No mucosal edema, rhinorrhea or nasal deformity. No epistaxis. Right sinus exhibits no maxillary sinus tenderness and no frontal sinus tenderness. Left sinus exhibits no maxillary sinus tenderness and no frontal sinus tenderness.   Mouth/Throat: Uvula is midline, oropharynx is clear and moist and mucous membranes are normal. No trismus in the jaw. Normal dentition. No uvula swelling. No posterior oropharyngeal erythema.   Eyes: Conjunctivae and lids are normal. Right eye exhibits no discharge. Left eye exhibits no discharge. No scleral icterus.   Sclera clear bilat   Neck: Trachea normal, normal range of motion, full passive range of motion without pain and phonation normal. Neck supple.   Cardiovascular: Normal rate, regular rhythm, normal heart sounds, intact distal pulses and normal pulses.    Pulmonary/Chest: Effort normal and breath sounds normal. No respiratory distress.   Abdominal: Soft. Normal appearance and bowel sounds are normal. She exhibits no distension, no pulsatile midline mass and no mass. There is no tenderness.   Musculoskeletal: She exhibits no edema or deformity.        Left shoulder: She exhibits decreased range of motion and pain.   Neurological: She is alert and oriented to person, place, and time. She exhibits normal muscle tone. Coordination normal.   Skin: Skin is warm, dry and intact. She is not diaphoretic. No pallor.   Psychiatric: She has a normal mood and affect. Her speech is normal and behavior is normal. Judgment and thought content normal. Cognition and memory are normal.   Nursing note and vitals reviewed.      Assessment:       1. Healthcare maintenance    2. Shoulder impingement syndrome, left        Plan:         Healthcare maintenance  -     Ambulatory Referral to [facility]    Shoulder impingement syndrome, left    Other orders  -     dexamethasone injection 6  mg; Inject 0.6 mLs (6 mg total) into the muscle one time.  -     ibuprofen (ADVIL,MOTRIN) 800 MG tablet; Take 1 tablet (800 mg total) by mouth every 8 (eight) hours as needed for Pain.  Dispense: 60 tablet; Refill: 2        Understanding Shoulder Impingement Syndrome    Shoulder impingement syndrome is a problem with the shoulder joint. It occurs when certain parts within the joint swell and are pinched. This can cause nagging pain and problems with moving the arm.  What causes shoulder impingement syndrome?  It is possible to develop impingement after years of normal shoulder use. But in most cases the condition occurs because of repeated overhead movements. These include such things as stocking shelves, painting, swimming, and throwing. These movements can irritate parts of the shoulder, leading to swelling. Swollen parts of the shoulder take up more room, making the joint space smaller. Some parts that may be involved include:  · A sac of fluid (bursa) that cushions the shoulder joint.  When the bursa is irritated, it may lead to a condition called bursitis. This is when the bursa swells with fluid, filling and squeezing the joint space.  · Fibrous tissues (tendons) that connect muscle to bone. When tendons are irritated, they may become swollen. This is called tendonitis.  · The end (acromion) of the shoulder blade. This bone may be flat or hooked. If the acromion is hooked, the joint space may be smaller than normal. Growths (bone spurs) on the acromion can also narrow the joint space. Acromion problems dont often cause impingement. But they can make it worse.  Symptoms of shoulder impingement syndrome  Symptoms include pain, pinching, or stiffness in your shoulder. Pain often comes with movement, particularly reaching overhead or backward. It may also be felt when the shoulder is at rest. Pain at night during sleep is common.  Treatment for shoulder impingement syndrome  Treatment will depend on the cause of  the problem, how bad the problem is, and if other parts of the shoulder are damaged. Treatment may include the following:  · Active rest. This allows the shoulder to heal. It means using the arm and shoulder, but avoiding activities that cause pain. These likely include reaching overhead or sleeping on your shoulder.  · Cold packs. These help reduce swelling and relieve pain.  · Prescription or over-the-counter pain medicines. These help relieve pain and swelling.  · Arm and shoulder exercises. These help keep your shoulder joint mobile as it heals. They also help improve muscle strength around the joint.  · Shots of medicine into the joint. This can help reduce swelling and pain for a short time.  If other measures dont work to relieve symptoms, you may need surgery. This opens up space in the joint to allow pain-free motion.  Possible complications of shoulder impingement syndrome  It might be tempting to stop using your shoulder completely to avoid pain. But doing so may lead to a condition called frozen shoulder. To help prevent this, follow instructions you are given for active rest and for doing exercises to help your shoulder heal.  When to call your healthcare provider  Call your healthcare provider right away if you have any of these:  · Fever of 100.4°F (38°C) or higher, or as directed  · Symptoms that dont get better, or get worse  · New symptoms   Date Last Reviewed: 3/10/2016  © 2905-4557 [employer]. [address] [place] [address], [place] [address]. All rights reserved. This information is not intended as a substitute for professional medical care. Always follow your healthcare professional's instructions.    Please drink plenty of fluids.  Please get plenty of rest.  Please return here or go to the Emergency Department for any concerns or worsening of condition.  If you were prescribed a narcotic medication, do not drive or operate heavy equipment or machinery while taking these  medications.  If you were not prescribed an anti-inflammatory medication, and if you do not have any history of stomach/intestinal ulcers, or kidney disease, or are not taking a blood thinner such as Coumadin, Plavix, Pradaxa, Eloquis, or Xaralta for example, it is OK to take over the counter Ibuprofen or Advil or Motrin or Aleve as directed.  Do not take these medications on an empty stomach.  Rest, ice, compression and elevation to the affected joint or limb as needed.  Please follow up with your primary care doctor or specialist as needed.    If you  smoke, please stop smoking.

## 2019-03-11 PROBLEM — Z80.3 FAMILY HISTORY OF BREAST CANCER: Status: ACTIVE | Noted: 2019-03-11

## 2019-08-26 ENCOUNTER — OFFICE VISIT (OUTPATIENT)
Dept: URGENT CARE | Facility: CLINIC | Age: 24
End: 2019-08-26
Payer: MEDICAID

## 2019-08-26 VITALS
TEMPERATURE: 98 F | SYSTOLIC BLOOD PRESSURE: 116 MMHG | DIASTOLIC BLOOD PRESSURE: 67 MMHG | HEART RATE: 73 BPM | BODY MASS INDEX: 24.03 KG/M2 | WEIGHT: 140 LBS

## 2019-08-26 DIAGNOSIS — M25.511 ACUTE PAIN OF RIGHT SHOULDER: Primary | ICD-10-CM

## 2019-08-26 PROCEDURE — 73030 X-RAY EXAM OF SHOULDER: CPT | Mod: RT,S$GLB,, | Performed by: RADIOLOGY

## 2019-08-26 PROCEDURE — 99214 PR OFFICE/OUTPT VISIT, EST, LEVL IV, 30-39 MIN: ICD-10-PCS | Mod: S$GLB,,, | Performed by: NURSE PRACTITIONER

## 2019-08-26 PROCEDURE — 73030 XR SHOULDER COMPLETE 2 OR MORE VIEWS RIGHT: ICD-10-PCS | Mod: RT,S$GLB,, | Performed by: RADIOLOGY

## 2019-08-26 PROCEDURE — 99214 OFFICE O/P EST MOD 30 MIN: CPT | Mod: S$GLB,,, | Performed by: NURSE PRACTITIONER

## 2019-08-26 RX ORDER — KETOROLAC TROMETHAMINE 30 MG/ML
15 INJECTION, SOLUTION INTRAMUSCULAR; INTRAVENOUS ONCE
Status: DISCONTINUED | OUTPATIENT
Start: 2019-08-26 | End: 2019-08-26

## 2019-08-26 RX ORDER — NAPROXEN 250 MG/1
250 TABLET ORAL 2 TIMES DAILY WITH MEALS
Qty: 30 TABLET | Refills: 0 | Status: SHIPPED | OUTPATIENT
Start: 2019-08-26 | End: 2019-09-10

## 2019-08-26 NOTE — PROGRESS NOTES
Subjective:       Patient ID: Nati Billy is a 24 y.o. female.    Vitals:  weight is 63.5 kg (140 lb). Her temperature is 97.6 °F (36.4 °C). Her blood pressure is 116/67 and her pulse is 73.     Chief Complaint: Back Pain    Pt states that she is having right shoulder pain  for about 7 days  . Pt states that she has a mosquito bite on right calf    Back Pain   This is a new problem. The current episode started in the past 7 days. The problem occurs constantly. The problem has been gradually worsening since onset. The quality of the pain is described as shooting and stabbing. Radiates to: right arm  The pain is at a severity of 7/10. The pain is moderate. Pertinent negatives include no abdominal pain, bladder incontinence, bowel incontinence, dysuria or numbness. Treatments tried: icy hot  The treatment provided no relief.       Constitution: Negative for fatigue.   Gastrointestinal: Negative for abdominal pain and bowel incontinence.   Genitourinary: Negative for dysuria, urgency, bladder incontinence and hematuria.   Musculoskeletal: Positive for pain (right shoulder). Negative for back pain, muscle cramps and history of spine disorder.   Skin: Negative for rash.   Neurological: Negative for coordination disturbances, numbness and tingling.       Objective:      Physical Exam   Constitutional: She is oriented to person, place, and time. Vital signs are normal. She appears well-developed and well-nourished. She is active and cooperative. No distress.   HENT:   Head: Normocephalic and atraumatic.   Nose: Nose normal.   Mouth/Throat: Oropharynx is clear and moist and mucous membranes are normal.   Eyes: Conjunctivae and lids are normal.   Neck: Trachea normal, normal range of motion, full passive range of motion without pain and phonation normal. Neck supple.   Cardiovascular: Normal rate, regular rhythm, normal heart sounds, intact distal pulses and normal pulses.   Pulmonary/Chest: Effort normal  and breath sounds normal.   Abdominal: Soft. Normal appearance and bowel sounds are normal. She exhibits no abdominal bruit, no pulsatile midline mass and no mass.   Musculoskeletal: She exhibits no edema or deformity.        Right shoulder: She exhibits decreased range of motion, tenderness and pain. She exhibits no bony tenderness, no swelling and no crepitus.        Arms:  Weakness at extrenal rotation of the right shoulder   Neurological: She is alert and oriented to person, place, and time. She has normal strength and normal reflexes. No sensory deficit.   Skin: Skin is warm, dry and intact. She is not diaphoretic.   Psychiatric: She has a normal mood and affect. Her speech is normal and behavior is normal. Judgment and thought content normal. Cognition and memory are normal.   Nursing note and vitals reviewed.    Cap refill 2 seconds, radial pulse 2+, sensation intact  X-ray Shoulder 2 Or More Views Right    Result Date: 8/26/2019  EXAMINATION: XR SHOULDER COMPLETE 2 OR MORE VIEWS RIGHT CLINICAL HISTORY: Pain in right shoulder TECHNIQUE: Two or three views of the right shoulder were performed. COMPARISON: None FINDINGS: Narrowing at the AC joint.  Glenohumeral joint fairly well maintained.  No subluxation.     Mild narrowing AC joint. Electronically signed by: Winston Regalado MD Date:    08/26/2019 Time:    16:22    Assessment:       1. Acute pain of right shoulder        Plan:         Acute pain of right shoulder  -     X-ray Shoulder 2 or More Views Right; Future; Expected date: 08/26/2019  -     Ambulatory referral to Orthopedics  -     Discontinue: ketorolac injection 15 mg  -     naproxen (NAPROSYN) 250 MG tablet; Take 1 tablet (250 mg total) by mouth 2 (two) times daily with meals. for 15 days  Dispense: 30 tablet; Refill: 0      Patient Instructions   PLEASE READ YOUR DISCHARGE INSTRUCTIONS ENTIRELY AS IT CONTAINS IMPORTANT INFORMATION.    Tylenol and ibuprofen for pain    Rest, ice, compress, and elevate  at home    Avoid prolonged use of the affected area until better.     Please return or see your primary care doctor if you develop new or worsening symptoms.     Please arrange follow up with your primary medical clinic as soon as possible. You must understand that you've received an Urgent Care treatment only and that you may be released before all of your medical problems are known or treated. You, the patient, will arrange for follow up as instructed. If your symptoms worsen or fail to improve you should go to the Emergency Room.    Shoulder Pain with Uncertain Cause  Shoulder pain can have many causes. Pain often comes from the structures that surround the shoulder joint. These are the joint capsule, ligaments, tendons, muscles, and bursa. Pain can also come from cartilage in the joint. Cartilage can become worn out or injured. Its important to know whats causing your pain so the healthcare provider can use the correct treatment. But sometimes its difficult to find the exact cause of shoulder pain. You may need to see a specialist (orthopedist). You may also need special tests such as a CT scan or MRI. The provider may need to use special tools to look inside the joint (arthroscopy).  Shoulder pain can be treated with a sling or a device that keeps your shoulder from moving. You can take an anti-inflammatory medicine such as ibuprofen to ease pain. You may need to do special shoulder exercises. Follow up with a specialist if the pain is severe or doesnt go away after a few weeks.  Home care  Follow these tips when caring for yourself at home:  · If a sling was given to you, leave it in place for the time advised by your healthcare provider. If you arent sure how long to wear it, ask for advice. If the sling becomes loose, adjust it so that your forearm is level with the ground. Your shoulder should feel well supported.  · Put an ice pack on the injured area for 20 minutes every 1 to 2 hours the first day.  You can make your own ice pack by putting ice cubes in a plastic bag. Wrap the bag in a thin towel. Continue with ice packs 3 to 4 times a day for the next 2 days. Then use the pack as needed to ease pain and swelling.  · You may use acetaminophen or ibuprofen to control pain, unless another pain medicine was prescribed. If you have chronic liver or kidney disease, talk with your healthcare provider before using these medicines. Also talk with your provider if youve ever had a stomach ulcer or GI bleeding.  · Shoulder pain may seem worse at night, when there is less to distract you from the pain. If you sleep on your side, try to keep weight off your painful shoulder. Propping pillows behind you may stop you from rolling over onto that shoulder during sleep.   · Shoulder and elbow joints can become stiff if left in a sling for too long. You should start range of motion exercises about 7 to 10 days after the injury. Talk with your provider to find out what type of exercises to do and how soon to start.  · You can take the sling off to shower or bathe.  Follow-up care  Follow up with your healthcare provider if you dont start to get better in the next 5 days.  When to seek medical advice  Call your healthcare provider right away if any of these occur:  · Pain or swelling gets worse or continues for more than a few days  · Your hand or fingers become cold, blue, numb, or tingly  · Large amount of bruising on your shoulder or upper arm  · Difficulty moving your hand or fingers  · Weakness in your hand or fingers  · Your shoulder becomes stiff  · It feels like your shoulder is popping out  · You are less able to do your daily activities  Date Last Reviewed: 10/1/2016  © 5675-1313 [employer]. [address] [place] [address], [place] [address]. All rights reserved. This information is not intended as a substitute for professional medical care. Always follow your healthcare professional's instructions.

## 2019-08-26 NOTE — LETTER
August 26, 2019      [facility] [address], [address] [address]  Phone: [phone]  Fax: [phone]       Patient: Nati Billy   YOB: 1995  Date of Visit: 08/26/2019    To Whom It May Concern:    Adin Billy  was at [facility] on 08/26/2019. She may return to work/school on 08/27/2019 with no restrictions. If you have any questions or concerns, or if I can be of further assistance, please do not hesitate to contact me.    Sincerely,      Migue Munoz NP

## 2019-08-26 NOTE — PATIENT INSTRUCTIONS
PLEASE READ YOUR DISCHARGE INSTRUCTIONS ENTIRELY AS IT CONTAINS IMPORTANT INFORMATION.    Tylenol and ibuprofen for pain    Rest, ice, compress, and elevate at home    Avoid prolonged use of the affected area until better.     Please return or see your primary care doctor if you develop new or worsening symptoms.     Please arrange follow up with your primary medical clinic as soon as possible. You must understand that you've received an Urgent Care treatment only and that you may be released before all of your medical problems are known or treated. You, the patient, will arrange for follow up as instructed. If your symptoms worsen or fail to improve you should go to the Emergency Room.    Shoulder Pain with Uncertain Cause  Shoulder pain can have many causes. Pain often comes from the structures that surround the shoulder joint. These are the joint capsule, ligaments, tendons, muscles, and bursa. Pain can also come from cartilage in the joint. Cartilage can become worn out or injured. Its important to know whats causing your pain so the healthcare provider can use the correct treatment. But sometimes its difficult to find the exact cause of shoulder pain. You may need to see a specialist (orthopedist). You may also need special tests such as a CT scan or MRI. The provider may need to use special tools to look inside the joint (arthroscopy).  Shoulder pain can be treated with a sling or a device that keeps your shoulder from moving. You can take an anti-inflammatory medicine such as ibuprofen to ease pain. You may need to do special shoulder exercises. Follow up with a specialist if the pain is severe or doesnt go away after a few weeks.  Home care  Follow these tips when caring for yourself at home:  · If a sling was given to you, leave it in place for the time advised by your healthcare provider. If you arent sure how long to wear it, ask for advice. If the sling becomes loose, adjust it so that your forearm  is level with the ground. Your shoulder should feel well supported.  · Put an ice pack on the injured area for 20 minutes every 1 to 2 hours the first day. You can make your own ice pack by putting ice cubes in a plastic bag. Wrap the bag in a thin towel. Continue with ice packs 3 to 4 times a day for the next 2 days. Then use the pack as needed to ease pain and swelling.  · You may use acetaminophen or ibuprofen to control pain, unless another pain medicine was prescribed. If you have chronic liver or kidney disease, talk with your healthcare provider before using these medicines. Also talk with your provider if youve ever had a stomach ulcer or GI bleeding.  · Shoulder pain may seem worse at night, when there is less to distract you from the pain. If you sleep on your side, try to keep weight off your painful shoulder. Propping pillows behind you may stop you from rolling over onto that shoulder during sleep.   · Shoulder and elbow joints can become stiff if left in a sling for too long. You should start range of motion exercises about 7 to 10 days after the injury. Talk with your provider to find out what type of exercises to do and how soon to start.  · You can take the sling off to shower or bathe.  Follow-up care  Follow up with your healthcare provider if you dont start to get better in the next 5 days.  When to seek medical advice  Call your healthcare provider right away if any of these occur:  · Pain or swelling gets worse or continues for more than a few days  · Your hand or fingers become cold, blue, numb, or tingly  · Large amount of bruising on your shoulder or upper arm  · Difficulty moving your hand or fingers  · Weakness in your hand or fingers  · Your shoulder becomes stiff  · It feels like your shoulder is popping out  · You are less able to do your daily activities  Date Last Reviewed: 10/1/2016  © 0421-3462 [employer]. [address] [place] [address], [place] [address]. All rights  reserved. This information is not intended as a substitute for professional medical care. Always follow your healthcare professional's instructions.

## 2020-06-29 ENCOUNTER — OUTSIDE PLACE OF SERVICE (OUTPATIENT)
Dept: CARDIOLOGY | Facility: CLINIC | Age: 25
End: 2020-06-29
Payer: MEDICAID

## 2020-06-29 PROCEDURE — 93010 ELECTROCARDIOGRAM REPORT: CPT | Mod: ,,, | Performed by: INTERNAL MEDICINE

## 2020-06-29 PROCEDURE — 93010 PR ELECTROCARDIOGRAM REPORT: ICD-10-PCS | Mod: ,,, | Performed by: INTERNAL MEDICINE

## 2021-04-16 ENCOUNTER — PATIENT MESSAGE (OUTPATIENT)
Dept: RESEARCH | Facility: HOSPITAL | Age: 26
End: 2021-04-16

## 2022-01-14 ENCOUNTER — LAB VISIT (OUTPATIENT)
Dept: PRIMARY CARE CLINIC | Facility: CLINIC | Age: 27
End: 2022-01-14
Payer: MEDICAID

## 2022-01-14 DIAGNOSIS — Z20.822 CONTACT WITH AND (SUSPECTED) EXPOSURE TO COVID-19: ICD-10-CM

## 2022-01-14 LAB
CTP QC/QA: YES
SARS-COV-2 AG RESP QL IA.RAPID: NEGATIVE

## 2022-01-14 PROCEDURE — 87811 SARS-COV-2 COVID19 W/OPTIC: CPT

## 2023-04-19 ENCOUNTER — OUTSIDE PLACE OF SERVICE (OUTPATIENT)
Dept: CARDIOLOGY | Facility: CLINIC | Age: 28
End: 2023-04-19
Payer: MEDICAID

## 2023-04-19 PROCEDURE — 93010 PR ELECTROCARDIOGRAM REPORT: ICD-10-PCS | Mod: ,,, | Performed by: INTERNAL MEDICINE

## 2023-04-19 PROCEDURE — 93010 ELECTROCARDIOGRAM REPORT: CPT | Mod: ,,, | Performed by: INTERNAL MEDICINE
